# Patient Record
Sex: FEMALE | Race: OTHER
[De-identification: names, ages, dates, MRNs, and addresses within clinical notes are randomized per-mention and may not be internally consistent; named-entity substitution may affect disease eponyms.]

---

## 2020-03-25 ENCOUNTER — HOSPITAL ENCOUNTER (EMERGENCY)
Dept: HOSPITAL 7 - FB.ED | Age: 60
Discharge: HOME | End: 2020-03-25
Payer: COMMERCIAL

## 2020-03-25 VITALS — HEART RATE: 94 BPM | DIASTOLIC BLOOD PRESSURE: 72 MMHG | SYSTOLIC BLOOD PRESSURE: 128 MMHG

## 2020-03-25 DIAGNOSIS — J40: Primary | ICD-10-CM

## 2020-03-25 DIAGNOSIS — Z79.899: ICD-10-CM

## 2020-03-25 DIAGNOSIS — I10: ICD-10-CM

## 2020-03-25 DIAGNOSIS — M19.90: ICD-10-CM

## 2020-03-25 DIAGNOSIS — Z88.8: ICD-10-CM

## 2020-03-25 DIAGNOSIS — J44.9: ICD-10-CM

## 2020-03-25 DIAGNOSIS — E66.9: ICD-10-CM

## 2020-03-25 DIAGNOSIS — E03.9: ICD-10-CM

## 2020-03-25 DIAGNOSIS — Z79.4: ICD-10-CM

## 2020-03-25 DIAGNOSIS — Z88.5: ICD-10-CM

## 2020-03-25 DIAGNOSIS — F41.9: ICD-10-CM

## 2020-03-25 DIAGNOSIS — Z87.891: ICD-10-CM

## 2020-03-25 DIAGNOSIS — E11.9: ICD-10-CM

## 2020-03-25 DIAGNOSIS — F32.9: ICD-10-CM

## 2020-03-25 PROCEDURE — U0002 COVID-19 LAB TEST NON-CDC: HCPCS

## 2020-03-25 NOTE — EDM.PDOC
ED HPI GENERAL MEDICAL PROBLEM





- General


Chief Complaint: Respiratory Problem


Stated Complaint: RESPARATORY PROBLEMS


Time Seen by Provider: 20 09:31


Source of Information: Reports: Patient


History Limitations: Reports: No Limitations





- History of Present Illness


INITIAL COMMENTS - FREE TEXT/NARRATIVE: 





Celina comes to [facility] ED with a 3 day hx of productive cough, some sweats, and 

possible low grade fever. In addition, there is some retrosternal chest pain 

aggravated with coughing, occasional wheezes, and some sore throat. There is 

presumptive exposure to a co worker last week on Thursday and Friday who had 

vacationed in [place]. In addition, Celina's life partner has thyroid CA awaiting 

treatment. She has taken cough meds OTC. 


Treatments PTA: Reports: Other (see below)


Other Treatments PTA: Jerod cough





- Related Data


 Allergies











Allergy/AdvReac Type Severity Reaction Status Date / Time


 


cyclobenzaprine HCl Allergy Mild Irritabilit Verified 20 09:28





[From Flexeril]   y  


 


ibuprofen Allergy Mild Hives Verified 20 09:28


 


morphine Allergy Mild Rash Verified 20 09:28


 


codeine Allergy Unknown Chest Verified 20 09:28





   Tightness  


 


meperidine HCl [From Demerol] Allergy  Rash Verified 20 09:28


 


tramadol Allergy  Other Verified 20 09:28











Home Meds: 


 Home Meds





Aspirin 81 mg PO DAILY 11/14/15 [History]


Hydrochlorothiazide 25 mg PO DAILY 11/14/15 [History]


Insulin Detemir [Levemir Flextouch] 80 unit SQ BEDTIME 11/14/15 [History]


Lisinopril 20 mg DAILY 11/14/15 [History]


Omeprazole 20 mg BID 11/14/15 [History]


atenoloL [Atenolol] 100 mg DAILY 11/14/15 [History]


metFORMIN [Glucophage] 1,000 mg BID 11/14/15 [History]


DULoxetine [Cymbalta] 60 mg PO BEDTIME 20 [History]


Insulin Aspart [NovoLOG] 38 units SQ BID 20 [History]


Loratadine 10 mg PO DAILY 20 [History]











Past Medical History


Cardiovascular History: Reports: Hypertension


Respiratory History: Reports: COPD


Gastrointestinal History: Reports: Diverticulosis


Genitourinary History: Reports: UTI, Recurrent


Other OB/GYN History: hysterectomy, 


Musculoskeletal History: Reports: Arthritis, Neck Pain, Chronic


Psychiatric History: Reports: Addiction, Anxiety, Depression, Psych 

Hospitalization(s), Suicide Attempt


Other Psychiatric History: ETOH addiction


Endocrine/Metabolic History: Reports: Diabetes, Type II, Hypothyroidism, Obesity

/BMI 30+





- Infectious Disease History


Infectious Disease History: Reports: Measles





- Past Surgical History


HEENT Surgical History: Reports: Adenoidectomy, Tonsillectomy


GI Surgical History: Reports: Cholecystectomy, Colonoscopy, EGD


Female  Surgical History: Reports: Hysterectomy, Salpingo-Oophorectomy, Tubal 

Ligation, Other (See Below)


Other Female  Surgeries/Procedures: rectocele





Social & Family History





- Family History


Family Medical History: Noncontributory





- Tobacco Use


Smoking Status *Q: Former Smoker


Years of Tobacco use: 35


Used Tobacco, but Quit: Yes


Month/Year Tobacco Last Used: 





- Caffeine Use


Caffeine Use: Reports: Coffee, Soda





- Recreational Drug Use


Recreational Drug Use: No





ED ROS GENERAL





- Review of Systems


Review Of Systems: See Below


Constitutional: Reports: Fever, Malaise, Night Sweats


HEENT: Reports: Throat Pain


Respiratory: Reports: Wheezing, Cough


Cardiovascular: Reports: Chest Pain


Endocrine: Reports: No Symptoms


GI/Abdominal: Reports: No Symptoms


: Reports: No Symptoms


Musculoskeletal: Reports: No Symptoms


Skin: Reports: No Symptoms


Neurological: Reports: No Symptoms


Psychiatric: Reports: No Symptoms


Hematologic/Lymphatic: Reports: No Symptoms


Immunologic: Reports: No Symptoms





ED EXAM, GENERAL





- Physical Exam


Exam: See Below


Exam Limited By: No Limitations


General Appearance: Alert, WD/WN, No Apparent Distress


Eye Exam: Bilateral Eye: EOMI, Normal Inspection, PERRL


Ears: Normal External Exam


Nose: Normal Inspection


Throat/Mouth: Normal Lips, Normal Teeth, Normal Gums, Normal Oropharynx, No 

Airway Compromise


Head: Normocephalic


Neck: Normal Inspection, Supple, Non-Tender


Respiratory/Chest: No Respiratory Distress, No Accessory Muscle Use, Chest Non-

Tender, Decreased Breath Sounds, Crackles, Rhonchi, Wheezing


Cardiovascular: Regular Rate, Rhythm, No Murmur


GI/Abdominal: Normal Bowel Sounds, Soft, Non-Tender, No Organomegaly, No 

Distention, No Mass


 (Female) Exam: Deferred


Rectal (Female) Exam: Deferred


Back Exam: Normal Inspection


Extremities: Normal Inspection


Neurological: Alert, Oriented, CN II-XII Intact


Psychiatric: Normal Affect, Normal Mood


Skin Exam: Warm, Dry, Intact, Normal Color


Lymphatic: No Adenopathy





Course





- Vital Signs


Text/Narrative:: 





Following assessment in Respiratory Isolation room, screening tests including 

CBC, CMP, RSS, Rapid Inf A&B were negative. The Covid 19 is pending. The chest 

xray was neg. Probable bronchitis, managed sxs at this time. 


Last Recorded V/S: 


 Last Vital Signs











Temp  36.8 C   20 09:21


 


Pulse  96   20 09:21


 


Resp  20   20 09:21


 


BP  132/78   20 09:21


 


Pulse Ox  99   20 09:21














- Orders/Labs/Meds


Orders: 


 Active Orders 24 hr











 Category Date Time Status


 


 Chest 1V Frontal [CR] Stat Exams  20 09:40 Taken


 


 CORONAVIRUS COVID-19, KEL Routine Lab  20 10:00 Received


 


 CULTURE BLOOD [BC] Routine Lab  20 10:10 Received


 


 CULTURE BLOOD [BC] Stat Lab  20 10:05 Received


 


 CULTURE STREP A CONFIRMATION [RM] Stat Lab  20 09:40 Results


 


 STREP SCRN A RAPID W CULT CONF [RM] Stat Lab  20 09:40 Results


 


 Isolation [COMM] Routine Oth  20 09:41 Ordered











Labs: 


 Laboratory Tests











  20 Range/Units





  10:05 10:05 


 


WBC  10.5   (4.5-12.0)  X10-3/uL


 


RBC  5.14   (3.23-5.20)  x10(6)uL


 


Hgb  14.8   (11.5-15.5)  g/dL


 


Hct  45.0   (30.0-51.3)  %


 


MCV  87.6   (80-96)  fL


 


MCH  28.9   (27.7-33.6)  pg


 


MCHC  33.0   (32.2-35.4)  g/dL


 


RDW  12.9   (11.5-15.5)  %


 


Plt Count  242   (125-369)  X10(3)uL


 


MPV  8.3   (7.4-10.4)  fL


 


Neut % (Auto)  66.2   (46-82)  %


 


Lymph % (Auto)  23.4   (13-37)  %


 


Mono % (Auto)  6.5   (4-12)  %


 


Eos % (Auto)  3   (1.0-5.0)  %


 


Baso % (Auto)  1   (0-2)  %


 


Neut # (Auto)  7.0   (1.6-8.3)  #


 


Lymph # (Auto)  2.4   (0.6-5.0)  #


 


Mono # (Auto)  0.7   (0.0-1.3)  #


 


Eos # (Auto)  0.3   (0.0-0.8)  #


 


Baso # (Auto)  0.1   (0.0-0.2)  #


 


Sodium   138  (135-145)  mmol/L


 


Potassium   4.4  (3.5-5.3)  mmol/L


 


Chloride   100  (100-110)  mmol/L


 


Carbon Dioxide   29  (21-32)  mmol/L


 


BUN   18  (7-18)  mg/dL


 


Creatinine   0.9  (0.55-1.02)  mg/dL


 


Est Cr Clr Drug Dosing   52.57  mL/min


 


Estimated GFR (MDRD)   > 60  (>60)  


 


BUN/Creatinine Ratio   20.0  (9-20)  


 


Glucose   188 H  ()  mg/dL


 


Calcium   9.3  (8.6-10.2)  mg/dL














Departure





- Departure


Time of Disposition: 12:11


Disposition: Home, Self-Care 01


Condition: Fair


Clinical Impression: 


 Bronchitis








- Discharge Information


*PRESCRIPTION DRUG MONITORING PROGRAM REVIEWED*: Not Applicable


*COPY OF PRESCRIPTION DRUG MONITORING REPORT IN PATIENT BETHEL: Not Applicable


Forms:  ED Department Discharge





Sepsis Event Note





- Evaluation


Sepsis Screening Result: No Definite Risk





- Focused Exam


Vital Signs: 


 Vital Signs











  Temp Pulse Resp BP Pulse Ox


 


 20 09:21  36.8 C  96  20  132/78  99











Date Exam was Performed: 20


Time Exam was Performed: 12:09





- Problem List & Annotations


(1) Bronchitis


SNOMED Code(s): 96261749


   Code(s): J40 - BRONCHITIS, NOT SPECIFIED AS ACUTE OR CHRONIC   Status: Acute

   Current Visit: Yes   Annotation/Comment:: Suspected viral bronchitis, 

managed sxs. No anti bx dispensed. We will contact her when Covid 19 screen is 

available.    





- Problem List Review


Problem List Initiated/Reviewed/Updated: Yes





- My Orders


Last 24 Hours: 


My Active Orders





20 09:40


Chest 1V Frontal [CR] Stat 


CULTURE STREP A CONFIRMATION [RM] Stat 


STREP SCRN A RAPID W CULT CONF [RM] Stat 





20 09:41


Isolation [COMM] Routine 





20 10:00


CORONAVIRUS COVID-19, KEL Routine 





20 10:05


CULTURE BLOOD [BC] Stat 





20 10:10


CULTURE BLOOD [BC] Routine 














- Assessment/Plan


Last 24 Hours: 


My Active Orders





20 09:40


Chest 1V Frontal [CR] Stat 


CULTURE STREP A CONFIRMATION [RM] Stat 


STREP SCRN A RAPID W CULT CONF [RM] Stat 





20 09:41


Isolation [COMM] Routine 





20 10:00


CORONAVIRUS COVID-19, KEL Routine 





20 10:05


CULTURE BLOOD [BC] Stat 





20 10:10


CULTURE BLOOD [BC] Routine 











Plan: 





Follow up with PCP.

## 2020-03-25 NOTE — CR
INDICATION:  Possible pneumonia.



CHEST:  An AP upright view of the chest 03/25/20 was compared with 09/11/14, 
again revealing evidence of exogenous obesity.  



The heart appears to be at the upper limits of normal in size but is emphasized 
by the AP positioning. 



A definite active infiltrate or effusion was not identified.  



IMPRESSION: 

1.  No definite acute process. 

2.  Possible ASHD.

3.  Exogenous obesity. 
MTDD

## 2020-12-02 ENCOUNTER — HOSPITAL ENCOUNTER (EMERGENCY)
Dept: HOSPITAL 7 - FB.ED | Age: 60
LOS: 1 days | Discharge: NURSING, REHAB OR LONG-TERM CARE | End: 2020-12-03
Payer: COMMERCIAL

## 2020-12-02 VITALS — SYSTOLIC BLOOD PRESSURE: 123 MMHG | DIASTOLIC BLOOD PRESSURE: 67 MMHG | HEART RATE: 120 BPM

## 2020-12-02 DIAGNOSIS — I21.4: Primary | ICD-10-CM

## 2020-12-02 DIAGNOSIS — Z88.5: ICD-10-CM

## 2020-12-02 DIAGNOSIS — E11.9: ICD-10-CM

## 2020-12-02 DIAGNOSIS — Z79.4: ICD-10-CM

## 2020-12-02 DIAGNOSIS — Z79.82: ICD-10-CM

## 2020-12-02 DIAGNOSIS — Z79.899: ICD-10-CM

## 2020-12-02 DIAGNOSIS — Z87.891: ICD-10-CM

## 2020-12-02 DIAGNOSIS — F41.9: ICD-10-CM

## 2020-12-02 DIAGNOSIS — Z88.8: ICD-10-CM

## 2020-12-02 DIAGNOSIS — I10: ICD-10-CM

## 2020-12-02 DIAGNOSIS — M19.90: ICD-10-CM

## 2020-12-02 DIAGNOSIS — F32.9: ICD-10-CM

## 2020-12-02 DIAGNOSIS — E03.9: ICD-10-CM

## 2020-12-02 DIAGNOSIS — J44.9: ICD-10-CM

## 2020-12-02 DIAGNOSIS — Z88.6: ICD-10-CM

## 2020-12-02 DIAGNOSIS — E66.9: ICD-10-CM

## 2020-12-02 PROCEDURE — 85730 THROMBOPLASTIN TIME PARTIAL: CPT

## 2020-12-02 PROCEDURE — 96365 THER/PROPH/DIAG IV INF INIT: CPT

## 2020-12-02 PROCEDURE — 93005 ELECTROCARDIOGRAM TRACING: CPT

## 2020-12-02 PROCEDURE — 85379 FIBRIN DEGRADATION QUANT: CPT

## 2020-12-02 PROCEDURE — 96375 TX/PRO/DX INJ NEW DRUG ADDON: CPT

## 2020-12-02 PROCEDURE — 99285 EMERGENCY DEPT VISIT HI MDM: CPT

## 2020-12-02 PROCEDURE — 83880 ASSAY OF NATRIURETIC PEPTIDE: CPT

## 2020-12-02 PROCEDURE — 85610 PROTHROMBIN TIME: CPT

## 2020-12-02 PROCEDURE — 85025 COMPLETE CBC W/AUTO DIFF WBC: CPT

## 2020-12-02 PROCEDURE — 82962 GLUCOSE BLOOD TEST: CPT

## 2020-12-02 PROCEDURE — 71045 X-RAY EXAM CHEST 1 VIEW: CPT

## 2020-12-02 PROCEDURE — 80053 COMPREHEN METABOLIC PANEL: CPT

## 2020-12-02 PROCEDURE — 84484 ASSAY OF TROPONIN QUANT: CPT

## 2020-12-02 PROCEDURE — 96376 TX/PRO/DX INJ SAME DRUG ADON: CPT

## 2020-12-02 PROCEDURE — 36415 COLL VENOUS BLD VENIPUNCTURE: CPT

## 2020-12-02 RX ADMIN — Medication PRN ML: at 20:51

## 2020-12-02 RX ADMIN — Medication PRN ML: at 20:50

## 2020-12-03 RX ADMIN — Medication PRN ML: at 03:22

## 2020-12-03 NOTE — EDM.PDOC
ED HPI GENERAL MEDICAL PROBLEM





- General


Chief Complaint: Chest Pain


Stated Complaint: CHEST PAIN


Time Seen by Provider: 20 19:00


Source of Information: Reports: Patient


History Limitations: Reports: No Limitations





- History of Present Illness


INITIAL COMMENTS - FREE TEXT/NARRATIVE: 





Patient presented to the ED because of chest pain which started 2 days ago. The 

pain is sharp, 4/10, with associated dyspnea.  Denies having any fever, chills,


cough/cold symptoms or any nausea, vomiting, diarrhea.


  ** midsternal


Pain Score (Numeric/FACES): 1





- Related Data


                                    Allergies











Allergy/AdvReac Type Severity Reaction Status Date / Time


 


cyclobenzaprine HCl Allergy Mild Irritabilit Verified 20 19:08





[From Flexeril]   y  


 


ibuprofen Allergy Mild Hives Verified 20 19:08


 


morphine Allergy Mild Rash Verified 20 19:08


 


codeine Allergy Unknown Chest Verified 20 19:08





   Tightness  


 


meperidine HCl [From Demerol] Allergy  Rash Verified 20 19:08


 


tramadol Allergy  Other Verified 20 19:08











Home Meds: 


                                    Home Meds





Aspirin 81 mg PO DAILY 11/14/15 [History]


Insulin Detemir [Levemir Flextouch] 80 unit SQ BEDTIME 11/14/15 [History]


Lisinopril 30 mg PO DAILY 11/14/15 [History]


metFORMIN [Glucophage] 1,000 mg PO BID 11/14/15 [History]


Insulin Aspart [NovoLOG] 10 units SQ BID PRN 20 [History]


Loratadine 10 mg PO DAILY 20 [History]


Amoxicillin 500 mg PO Q6H 20 [History]


DULoxetine [Cymbalta] 20 mg PO DAILY 20 [History]


DULoxetine [Cymbalta] 60 mg PO DAILY 20 [History]


Fluconazole [Diflucan] 150 mg PO DAILY PRN 20 [History]


Metoprolol Succinate 50 mg PO DAILY 20 [History]


Omeprazole 40 mg PO DAILY 20 [History]


oxyCODONE HCl/Acetaminophen [Oxycodone-Acetaminophen 5-325] 1 each PO Q6H PRN 

20 [History]











Past Medical History


HEENT History: Reports: Other (See Below)


Other HEENT History: 1220 lower bottom teeth extracted/sutures in place, 

placed on Amoxicillin 500mg and oxycodone/apap 5mg prn


Cardiovascular History: Reports: Hypertension


Respiratory History: Reports: COPD


Gastrointestinal History: Reports: Diverticulosis


Genitourinary History: Reports: UTI, Recurrent


Other OB/GYN History: hysterectomy, 


Musculoskeletal History: Reports: Arthritis, Neck Pain, Chronic


Psychiatric History: Reports: Addiction, Anxiety, Depression, Psych 

Hospitalization(s), Suicide Attempt


Other Psychiatric History: ETOH addiction


Endocrine/Metabolic History: Reports: Diabetes, Type II, Hypothyroidism, 

Obesity/BMI 30+





- Infectious Disease History


Infectious Disease History: Reports: Measles





- Past Surgical History


Head Surgeries/Procedures: Reports: None


HEENT Surgical History: Reports: Adenoidectomy, Tonsillectomy


Cardiovascular Surgical History: Reports: None


Respiratory Surgical History: Reports: None


GI Surgical History: Reports: Cholecystectomy, Colonoscopy, EGD


Female  Surgical History: Reports: Hysterectomy, Salpingo-Oophorectomy, Tubal 

Ligation, Other (See Below)


Other Female  Surgeries/Procedures: rectocele


Endocrine Surgical History: Reports: Other (See Below)


Other Endocrine Surgeries/Procedures: blood sugar reader right upper/arm





Social & Family History





- Family History


Family Medical History: No Pertinent Family History





- Tobacco Use


Tobacco Use Status *Q: Former Tobacco User


Used Tobacco, but Quit: Yes


Month/Year Tobacco Last Used: 





- Caffeine Use


Caffeine Use: Reports: Soda


Caffeine Use Comment: 1-2 diet sodas daily





- Recreational Drug Use


Recreational Drug Use: No





ED ROS GENERAL





- Review of Systems


Review Of Systems: See Below


Constitutional: Reports: No Symptoms


HEENT: Reports: No Symptoms


Respiratory: Reports: No Symptoms


Cardiovascular: Reports: Chest Pain


Endocrine: Reports: No Symptoms


GI/Abdominal: Reports: No Symptoms


: Reports: No Symptoms


Musculoskeletal: Reports: No Symptoms


Skin: Reports: No Symptoms


Neurological: Reports: No Symptoms


Psychiatric: Reports: No Symptoms





ED EXAM, GENERAL





- Physical Exam


Exam: See Below


Exam Limited By: No Limitations


General Appearance: Alert, No Apparent Distress


Eye Exam: Bilateral Eye: PERRL


Ears: Normal External Exam


Nose: Normal Inspection, Normal Mucosa


Throat/Mouth: Normal Inspection


Head: Atraumatic, Normocephalic


Neck: Normal Inspection, Supple, Non-Tender, Full Range of Motion


Respiratory/Chest: No Respiratory Distress, Lungs Clear, Normal Breath Sounds


Cardiovascular: Normal Peripheral Pulses, Regular Rate, Rhythm, No Edema


GI/Abdominal: Normal Bowel Sounds, Soft, Non-Tender, No Organomegaly


Back Exam: Normal Inspection, Full Range of Motion


Extremities: Normal Inspection, Normal Range of Motion, Non-Tender





Course





- Vital Signs


Text/Narrative:: 





Labs/EKG/CXR was reviewed with patient


EKG-new LBBB


Trop#1-51


Trop #2-1015


 po x1


NTG 0.4 mg SL


Morphine 2 mg IV x1


Heparin 4000 U IV bolus


Heparin drip @ 1000 U/hr


Case discussed with Dr Mcclain who agreed with the above plan of care.





Last Recorded V/S: 





                                Last Vital Signs











Temp  36.7 C   20 21:51


 


Pulse  120 H  20 21:51


 


Resp  20   20 21:51


 


BP  123/67   20 21:51


 


Pulse Ox  100   20 21:51














- Orders/Labs/Meds


Orders: 





                               Active Orders 24 hr











 Category Date Time Status


 


 Blood Glucose Check, Bedside [RC] ONETIME Care  20 22:46 Active


 


 EKG Documentation Completion [RC] ASDIRECTED Care  20 19:36 Active


 


 EKG Documentation Completion [RC] ASDIRECTED Care  20 02:30 Ordered


 


 Oxygen Therapy Adult [Oxygen Therapy, ED] [RC] Care  20 22:46 Active





 ASDIRECTED   


 


 Chest 1V Frontal [CR] Stat Exams  20 19:36 Taken


 


 Sodium Chloride 0.9% [Saline Flush] Med  20 20:45 Active





 10 ml FLUSH ASDIRECTED PRN   


 


 EKG 12 Lead [EK] Routine Ther  20 19:35 Ordered


 


 EKG 12 Lead [EK] Routine Ther  20 02:26 Ordered








                                Medication Orders





Sodium Chloride (Saline Flush)  10 ml FLUSH ASDIRECTED PRN


   PRN Reason: Keep Vein Open


   Last Admin: 20 20:51  Dose: 10 ml


   Documented by: MICHAEL


   Admin: 20 20:50  Dose: 10 ml


   Documented by: MICHAEL








Labs: 





                                Laboratory Tests











  20 Range/Units





  19:15 19:15 19:15 


 


WBC  11.7 H    (3.0-10.3)  x10-3/uL


 


RBC  4.97    (3.60-5.20)  x10(6)uL


 


Hgb  14.0    (11.4-15.5)  g/dL


 


Hct  43.3    (34.2-48.2)  %


 


MCV  87.1    (76.7-100.5)  fL


 


MCH  28.1    (23.9-33.9)  pg


 


MCHC  32.3    (31.9-34.8)  g/dL


 


RDW  13.7    (12.3-16.5)  %


 


Plt Count  245    (151-488)  x10(3)uL


 


MPV  8.1    (7.1-12.4)  fL


 


Neut % (Auto)  66.9    (30.8-76.2)  %


 


Lymph % (Auto)  26.0    (18.4-52.1)  %


 


Mono % (Auto)  4.7    (4.4-15.7)  %


 


Eos % (Auto)  2.0    (0.6-8.1)  %


 


Baso % (Auto)  0.4    (0.2-1.5)  %


 


Neut # (Auto)  7.8 H    (1.5-6.3)  x10-3/uL


 


Lymph # (Auto)  3.0    (1.0-4.4)  x10-3/uL


 


Mono # (Auto)  0.5    (0.3-1.0)  x10-3/uL


 


Eos # (Auto)  0.2    (0.0-0.8)  x10-3/uL


 


Baso # (Auto)  0.1    (0.0-0.1)  x10-3/uL


 


PT     (9.0-11.1)  sec


 


INR     (1.00-1.24)  


 


APTT     (24.4-33.2)  SECONDS


 


D-Dimer, Quantitative   1.93 H   (0.0-0.59)  mg/LFEU


 


Sodium    136  (135-145)  mmol/L


 


Potassium    4.0  (3.5-5.3)  mmol/L


 


Chloride    99 L  (100-110)  mmol/L


 


Carbon Dioxide    28  (21-32)  mmol/L


 


BUN    15  (7-18)  mg/dL


 


Creatinine    0.9  (0.55-1.02)  mg/dL


 


Est Cr Clr Drug Dosing    TNP  


 


Estimated GFR (MDRD)    > 60  (>60)  


 


BUN/Creatinine Ratio    16.7  (9-20)  


 


Glucose    238 H  ()  mg/dL


 


POC Glucose     ()  mg/dL


 


Calcium    8.9  (8.6-10.2)  mg/dL


 


Total Bilirubin    0.6  (0.1-1.3)  mg/dL


 


AST    29 H  (5-25)  IU/L


 


ALT    28  (12-36)  U/L


 


Alkaline Phosphatase    108  ()  IU/L


 


Troponin I     (4.0-60.3)  pg/mL


 


NT-Pro-B Natriuret Pep     (<=125)  pg/mL


 


Total Protein    8.9 H  (6.0-8.0)  g/dL


 


Albumin    3.4  (3.2-4.6)  g/dL


 


Globulin    5.5  g/dL


 


Albumin/Globulin Ratio    0.6  














  20 Range/Units





  19:15 19:15 19:22 


 


WBC     (3.0-10.3)  x10-3/uL


 


RBC     (3.60-5.20)  x10(6)uL


 


Hgb     (11.4-15.5)  g/dL


 


Hct     (34.2-48.2)  %


 


MCV     (76.7-100.5)  fL


 


MCH     (23.9-33.9)  pg


 


MCHC     (31.9-34.8)  g/dL


 


RDW     (12.3-16.5)  %


 


Plt Count     (151-488)  x10(3)uL


 


MPV     (7.1-12.4)  fL


 


Neut % (Auto)     (30.8-76.2)  %


 


Lymph % (Auto)     (18.4-52.1)  %


 


Mono % (Auto)     (4.4-15.7)  %


 


Eos % (Auto)     (0.6-8.1)  %


 


Baso % (Auto)     (0.2-1.5)  %


 


Neut # (Auto)     (1.5-6.3)  x10-3/uL


 


Lymph # (Auto)     (1.0-4.4)  x10-3/uL


 


Mono # (Auto)     (0.3-1.0)  x10-3/uL


 


Eos # (Auto)     (0.0-0.8)  x10-3/uL


 


Baso # (Auto)     (0.0-0.1)  x10-3/uL


 


PT   10.9   (9.0-11.1)  sec


 


INR   1.01   (1.00-1.24)  


 


APTT   25.9   (24.4-33.2)  SECONDS


 


D-Dimer, Quantitative     (0.0-0.59)  mg/LFEU


 


Sodium     (135-145)  mmol/L


 


Potassium     (3.5-5.3)  mmol/L


 


Chloride     (100-110)  mmol/L


 


Carbon Dioxide     (21-32)  mmol/L


 


BUN     (7-18)  mg/dL


 


Creatinine     (0.55-1.02)  mg/dL


 


Est Cr Clr Drug Dosing     


 


Estimated GFR (MDRD)     (>60)  


 


BUN/Creatinine Ratio     (9-20)  


 


Glucose     ()  mg/dL


 


POC Glucose    189 H  ()  mg/dL


 


Calcium     (8.6-10.2)  mg/dL


 


Total Bilirubin     (0.1-1.3)  mg/dL


 


AST     (5-25)  IU/L


 


ALT     (12-36)  U/L


 


Alkaline Phosphatase     ()  IU/L


 


Troponin I  51.0    (4.0-60.3)  pg/mL


 


NT-Pro-B Natriuret Pep  25    (<=125)  pg/mL


 


Total Protein     (6.0-8.0)  g/dL


 


Albumin     (3.2-4.6)  g/dL


 


Globulin     g/dL


 


Albumin/Globulin Ratio     














  20 Range/Units





  02:00 


 


WBC   (3.0-10.3)  x10-3/uL


 


RBC   (3.60-5.20)  x10(6)uL


 


Hgb   (11.4-15.5)  g/dL


 


Hct   (34.2-48.2)  %


 


MCV   (76.7-100.5)  fL


 


MCH   (23.9-33.9)  pg


 


MCHC   (31.9-34.8)  g/dL


 


RDW   (12.3-16.5)  %


 


Plt Count   (151-488)  x10(3)uL


 


MPV   (7.1-12.4)  fL


 


Neut % (Auto)   (30.8-76.2)  %


 


Lymph % (Auto)   (18.4-52.1)  %


 


Mono % (Auto)   (4.4-15.7)  %


 


Eos % (Auto)   (0.6-8.1)  %


 


Baso % (Auto)   (0.2-1.5)  %


 


Neut # (Auto)   (1.5-6.3)  x10-3/uL


 


Lymph # (Auto)   (1.0-4.4)  x10-3/uL


 


Mono # (Auto)   (0.3-1.0)  x10-3/uL


 


Eos # (Auto)   (0.0-0.8)  x10-3/uL


 


Baso # (Auto)   (0.0-0.1)  x10-3/uL


 


PT   (9.0-11.1)  sec


 


INR   (1.00-1.24)  


 


APTT   (24.4-33.2)  SECONDS


 


D-Dimer, Quantitative   (0.0-0.59)  mg/LFEU


 


Sodium   (135-145)  mmol/L


 


Potassium   (3.5-5.3)  mmol/L


 


Chloride   (100-110)  mmol/L


 


Carbon Dioxide   (21-32)  mmol/L


 


BUN   (7-18)  mg/dL


 


Creatinine   (0.55-1.02)  mg/dL


 


Est Cr Clr Drug Dosing   


 


Estimated GFR (MDRD)   (>60)  


 


BUN/Creatinine Ratio   (9-20)  


 


Glucose   ()  mg/dL


 


POC Glucose   ()  mg/dL


 


Calcium   (8.6-10.2)  mg/dL


 


Total Bilirubin   (0.1-1.3)  mg/dL


 


AST   (5-25)  IU/L


 


ALT   (12-36)  U/L


 


Alkaline Phosphatase   ()  IU/L


 


Troponin I  1014.7 H*  (4.0-60.3)  pg/mL


 


NT-Pro-B Natriuret Pep   (<=125)  pg/mL


 


Total Protein   (6.0-8.0)  g/dL


 


Albumin   (3.2-4.6)  g/dL


 


Globulin   g/dL


 


Albumin/Globulin Ratio   











Meds: 





Medications











Generic Name Dose Route Start Last Admin





  Trade Name Freq  PRN Reason Stop Dose Admin


 


Sodium Chloride  10 ml  20 20:45  20 20:51





  Saline Flush  FLUSH   10 ml





  ASDIRECTED PRN   Administration





  Keep Vein Open  














Discontinued Medications














Generic Name Dose Route Start Last Admin





  Trade Name Freq  PRN Reason Stop Dose Admin


 


Aspirin  324 mg  20 19:37  20 19:15





  Aspirin  PO  20 19:38  324 mg





  NOW STA   Administration


 


Morphine Sulfate  2 mg  20 20:47  20 20:51





  Morphine  IVPUSH  20 20:48  2 mg





  NOW STA   Administration














Departure





- Departure


Time of Disposition: 02:50


Disposition: [facility] to [facility]


Reason for Transfer *Q: Other


Condition: Good


Clinical Impression: 


 NSTEMI (non-ST elevated myocardial infarction)





Referrals: 


PCP,None [Primary Care Provider] - 





Sepsis Event Note (ED)





- Evaluation


Sepsis Screening Result: No Definite Risk





- Focused Exam


Vital Signs: 





                                   Vital Signs











  Temp Pulse Resp BP BP Pulse Ox Pulse Ox


 


 20 21:51  36.7 C  120 H  20  123/67   100 


 


 20 20:27   111 H  17  167/97 H  172/103 H  100 


 


 20 19:15        98


 


 20 18:57  36.6 C  106 H  18   188/100 H  100 














- My Orders


Last 24 Hours: 





My Active Orders





20 19:35


EKG 12 Lead [EK] Routine 





20 19:36


EKG Documentation Completion [RC] ASDIRECTED 


Chest 1V Frontal [CR] Stat 





20 20:45


Sodium Chloride 0.9% [Saline Flush]   10 ml FLUSH ASDIRECTED PRN 





20 22:46


Blood Glucose Check, Bedside [RC] ONETIME 


Oxygen Therapy Adult [Oxygen Therapy, ED] [RC] ASDIRECTED 





20 02:26


EKG 12 Lead [EK] Routine 





20 02:30


EKG Documentation Completion [RC] ASDIRECTED 














- Assessment/Plan


Last 24 Hours: 





My Active Orders





20 19:35


EKG 12 Lead [EK] Routine 





20 19:36


EKG Documentation Completion [RC] ASDIRECTED 


Chest 1V Frontal [CR] Stat 





20 20:45


Sodium Chloride 0.9% [Saline Flush]   10 ml FLUSH ASDIRECTED PRN 





20 22:46


Blood Glucose Check, Bedside [RC] ONETIME 


Oxygen Therapy Adult [Oxygen Therapy, ED] [RC] ASDIRECTED 





20 02:26


EKG 12 Lead [EK] Routine 





20 02:30


EKG Documentation Completion [RC] ASDIRECTED

## 2021-02-22 ENCOUNTER — HOSPITAL ENCOUNTER (EMERGENCY)
Dept: HOSPITAL 7 - FB.ED | Age: 61
Discharge: NURSING, REHAB OR LONG-TERM CARE | End: 2021-02-22
Payer: COMMERCIAL

## 2021-02-22 VITALS — SYSTOLIC BLOOD PRESSURE: 94 MMHG | HEART RATE: 91 BPM | DIASTOLIC BLOOD PRESSURE: 59 MMHG

## 2021-02-22 DIAGNOSIS — I10: ICD-10-CM

## 2021-02-22 DIAGNOSIS — Z88.6: ICD-10-CM

## 2021-02-22 DIAGNOSIS — I25.2: ICD-10-CM

## 2021-02-22 DIAGNOSIS — I20.0: Primary | ICD-10-CM

## 2021-02-22 DIAGNOSIS — Z79.4: ICD-10-CM

## 2021-02-22 DIAGNOSIS — E66.9: ICD-10-CM

## 2021-02-22 DIAGNOSIS — Z88.5: ICD-10-CM

## 2021-02-22 DIAGNOSIS — E11.9: ICD-10-CM

## 2021-02-22 DIAGNOSIS — Z79.82: ICD-10-CM

## 2021-02-22 NOTE — EDM.PDOC
ED HPI GENERAL MEDICAL PROBLEM





- General


Chief Complaint: Cardiovascular Problem


Stated Complaint: CHEST PAIN


Time Seen by Provider: 21 14:01


Source of Information: Reports: Patient


History Limitations: Reports: No Limitations





- History of Present Illness


INITIAL COMMENTS - FREE TEXT/NARRATIVE: 





Presents with non-radiating substernal chest pain, described as an ache, onset 

0600 today. Pain is exacerbated with exertion. Denies SOB. Has had intermittent 

chest pain x 1 week. Patient had a NSTEMI on 20, transferred to [facility], 

underwent LAD stent placement. Patient states today's pain feels similar. PMHx 

includes T2DM, CAD, and HTN. Patient took ASA 81mg PO this morning, and NTG SL x

1 at 12 noon. Chest pain improved after the NTG (from 4/10 to 3/10).


Onset Date: 21


Onset Time: 06:00


Location: Reports: Chest


Quality: Reports: Ache


Severity: Moderate


Worsens with: Reports: Other (Exertion)





- Related Data


                                    Allergies











Allergy/AdvReac Type Severity Reaction Status Date / Time


 


cyclobenzaprine HCl Allergy Mild Irritabilit Verified 20 19:08





[From Flexeril]   y  


 


ibuprofen Allergy Mild Hives Verified 20 19:08


 


morphine Allergy Mild Rash Verified 20 19:08


 


codeine Allergy Unknown Chest Verified 20 19:08





   Tightness  


 


tramadol Allergy  Other Verified 20 19:08











Home Meds: 


                                    Home Meds





Aspirin 81 mg PO DAILY 11/14/15 [History]


Insulin Detemir [Levemir Flextouch] 80 unit SQ BEDTIME 11/14/15 [History]


Lisinopril 30 mg PO DAILY 11/14/15 [History]


metFORMIN [Glucophage] 1,000 mg PO BID 11/14/15 [History]


Insulin Aspart [NovoLOG] 10 units SQ BID PRN 20 [History]


Loratadine 10 mg PO DAILY 20 [History]


Amoxicillin 500 mg PO Q6H 20 [History]


DULoxetine [Cymbalta] 20 mg PO DAILY 20 [History]


DULoxetine [Cymbalta] 60 mg PO DAILY 20 [History]


Fluconazole [Diflucan] 150 mg PO DAILY PRN 20 [History]


Metoprolol Succinate 50 mg PO DAILY 20 [History]


Omeprazole 40 mg PO DAILY 20 [History]


oxyCODONE HCl/Acetaminophen [Oxycodone-Acetaminophen 5-325] 1 each PO Q6H PRN 

20 [History]











Past Medical History


HEENT History: Reports: Other (See Below)


Other HEENT History: 20 lower bottom teeth extracted/sutures in place, 

placed on Amoxicillin 500mg and oxycodone/apap 5mg prn


Cardiovascular History: Reports: Hypertension, MI (2020), PTCA, Other (See 

Below) (LBBB)


Respiratory History: Reports: COPD


Gastrointestinal History: Reports: Diverticulosis


Genitourinary History: Reports: UTI, Recurrent


Other OB/GYN History: hysterectomy, 


Musculoskeletal History: Reports: Arthritis, Neck Pain, Chronic


Psychiatric History: Reports: Addiction, Anxiety, Depression, Psych 

Hospitalization(s), Suicide Attempt


Other Psychiatric History: ETOH addiction


Endocrine/Metabolic History: Reports: Diabetes, Type II, Hypothyroidism, 

Obesity/BMI 30+





- Infectious Disease History


Infectious Disease History: Reports: Measles





- Past Surgical History


Head Surgeries/Procedures: Reports: None


HEENT Surgical History: Reports: Adenoidectomy, Tonsillectomy


Cardiovascular Surgical History: Reports: Coronary Artery Stent


Respiratory Surgical History: Reports: None


GI Surgical History: Reports: Cholecystectomy, Colonoscopy, EGD


Female  Surgical History: Reports: Hysterectomy, Salpingo-Oophorectomy, Tubal 

Ligation, Other (See Below)


Other Female  Surgeries/Procedures: rectocele


Endocrine Surgical History: Reports: Other (See Below)


Other Endocrine Surgeries/Procedures: blood sugar reader right upper/arm





Social & Family History





- Family History


Family Medical History: No Pertinent Family History





- Tobacco Use


Tobacco Use Within Last Twelve Months: No





- Caffeine Use


Caffeine Use: Reports: Soda


Caffeine Use Comment: 1-2 diet sodas daily





ED ROS GENERAL





- Review of Systems


Review Of Systems: Comprehensive ROS is negative, except as noted in HPI.





ED EXAM, GENERAL





- Physical Exam


Exam: See Below


Exam Limited By: No Limitations


General Appearance: Alert, WD/WN, No Apparent Distress


Throat/Mouth: No Airway Compromise


Head: Atraumatic, Normocephalic


Neck: Full Range of Motion


Respiratory/Chest: No Respiratory Distress, Lungs Clear, Normal Breath Sounds, 

Chest Non-Tender


Cardiovascular: Regular Rate, Rhythm, No Gallop, No Murmur


GI/Abdominal: Normal Bowel Sounds, Soft, Non-Tender, No Distention


Back Exam: Full Range of Motion


Extremities: Normal Range of Motion, Non-Tender


Neurological: Alert, Normal Cognition


Psychiatric: Normal Affect, Normal Mood


Skin Exam: Warm, Dry, Intact


  ** #1 Interpretation


EKG Date: 21


Time: 13:48


Rhythm: NSR


Rate (Beats/Min): 98


Axis: Normal


P-Wave: Present


QRS: Other (incomplete LBBB)


ST-T: Normal


QT: Normal


Comparison: No Change (2020)





Course





- Vital Signs


Last Recorded V/S: 


                                Last Vital Signs











Temp  37.0 C   21 13:51


 


Pulse  96   21 13:51


 


Resp  18   21 13:51


 


BP  111/57 L  21 14:22


 


Pulse Ox  98   21 13:51














- Orders/Labs/Meds


Orders: 


                               Active Orders 24 hr











 Category Date Time Status


 


 EKG Documentation Completion [RC] ASDIRECTED Care  21 13:52 Active


 


 CXR [Chest 1V Frontal] [CR] Stat Exams  21 13:52 Taken


 


 Heparin 25,000 Units @ 20MLS/HR Med  21 14:45 Ordered





 Heparin Sodium/0.45% NaCl [Heparin 25,000 Units in 1/2   





  ML] 500 ml   





 IV ASDIRECTED   


 


 Nitroglycerin [Nitrostat] Med  21 13:59 Active





 0.4 mg SL Q5M PRN   


 


 Sodium Chloride 0.9% [Normal Saline] 500 ml Med  21 14:33 Active





 IV .BOLUS   


 


 Sodium Chloride 0.9% [Saline Flush] Med  21 13:52 Active





 10 ml FLUSH ASDIRECTED PRN   


 


 Saline Lock Insert [OM.PC] Routine Oth  21 13:52 Ordered


 


 EKG 12 Lead [EK] Stat Ther  21 13:52 Ordered








                                Medication Orders





Sodium Chloride (Normal Saline)  500 mls @ 500 mls/hr IV .BOLUS ONE


   Stop: 21 15:32


   Last Admin: 21 14:40  Dose: 500 mls/hr


   Documented by: JYOTHI


Heparin Sodium/Sodium Chloride (Heparin 25,000 Units In 1/2 Ns 500 Ml)  500 mls 

@ 20 mls/hr IV ASDIRECTED AKANKSHA


Nitroglycerin (Nitrostat)  0.4 mg SL Q5M PRN


   PRN Reason: Chest Pain


   Last Admin: 21 14:22  Dose: 0.4 mg


   Documented by: JYOTHI


Sodium Chloride (Saline Flush)  10 ml FLUSH ASDIRECTED PRN


   PRN Reason: Keep Vein Open








Labs: 


                                Laboratory Tests











  21 Range/Units





  14:09 14:09 14:09 


 


WBC  9.4    (3.0-10.3)  x10-3/uL


 


RBC  4.12    (3.60-5.20)  x10(6)uL


 


Hgb  12.0    (11.4-15.5)  g/dL


 


Hct  35.0    (34.2-48.2)  %


 


MCV  85.1    (76.7-100.5)  fL


 


MCH  29.0    (23.9-33.9)  pg


 


MCHC  34.1    (31.9-34.8)  g/dL


 


RDW  14.0    (12.3-16.5)  %


 


Plt Count  204    (151-488)  x10(3)uL


 


MPV  7.5    (7.1-12.4)  fL


 


Neut % (Auto)  54.8    (30.8-76.2)  %


 


Lymph % (Auto)  34.3    (18.4-52.1)  %


 


Mono % (Auto)  6.5    (4.4-15.7)  %


 


Eos % (Auto)  2.8    (0.6-8.1)  %


 


Baso % (Auto)  1.6 H    (0.2-1.5)  %


 


Neut # (Auto)  5.2    (1.5-6.3)  x10-3/uL


 


Lymph # (Auto)  3.2    (1.0-4.4)  x10-3/uL


 


Mono # (Auto)  0.6    (0.3-1.0)  x10-3/uL


 


Eos # (Auto)  0.3    (0.0-0.8)  x10-3/uL


 


Baso # (Auto)  0.1    (0.0-0.1)  x10-3/uL


 


PT   10.9   (9.0-11.1)  sec


 


INR   1.01   (1.00-1.24)  


 


APTT   24.5   (24.4-33.2)  SECONDS


 


Sodium    132 L  (135-145)  mmol/L


 


Potassium    4.1  (3.5-5.3)  mmol/L


 


Chloride    97 L  (100-110)  mmol/L


 


Carbon Dioxide    26  (21-32)  mmol/L


 


BUN    21 H  (7-18)  mg/dL


 


Creatinine    1.2 H  (0.55-1.02)  mg/dL


 


Est Cr Clr Drug Dosing    TNP  


 


Estimated GFR (MDRD)    46 L  (>60)  


 


BUN/Creatinine Ratio    17.5  (9-20)  


 


Glucose    216 H  ()  mg/dL


 


Calcium    8.4 L  (8.6-10.2)  mg/dL


 


Total Bilirubin    0.4  (0.1-1.3)  mg/dL


 


AST    30 H  (5-25)  IU/L


 


ALT    30  (12-36)  U/L


 


Alkaline Phosphatase    124 H  ()  IU/L


 


Troponin I     (4.0-60.3)  pg/mL


 


Total Protein    8.7 H  (6.0-8.0)  g/dL


 


Albumin    3.5  (3.2-4.6)  g/dL


 


Globulin    5.2  g/dL


 


Albumin/Globulin Ratio    0.7  














  // Range/Units





  14:09 


 


WBC   (3.0-10.3)  x10-3/uL


 


RBC   (3.60-5.20)  x10(6)uL


 


Hgb   (11.4-15.5)  g/dL


 


Hct   (34.2-48.2)  %


 


MCV   (76.7-100.5)  fL


 


MCH   (23.9-33.9)  pg


 


MCHC   (31.9-34.8)  g/dL


 


RDW   (12.3-16.5)  %


 


Plt Count   (151-488)  x10(3)uL


 


MPV   (7.1-12.4)  fL


 


Neut % (Auto)   (30.8-76.2)  %


 


Lymph % (Auto)   (18.4-52.1)  %


 


Mono % (Auto)   (4.4-15.7)  %


 


Eos % (Auto)   (0.6-8.1)  %


 


Baso % (Auto)   (0.2-1.5)  %


 


Neut # (Auto)   (1.5-6.3)  x10-3/uL


 


Lymph # (Auto)   (1.0-4.4)  x10-3/uL


 


Mono # (Auto)   (0.3-1.0)  x10-3/uL


 


Eos # (Auto)   (0.0-0.8)  x10-3/uL


 


Baso # (Auto)   (0.0-0.1)  x10-3/uL


 


PT   (9.0-11.1)  sec


 


INR   (1.00-1.24)  


 


APTT   (24.4-33.2)  SECONDS


 


Sodium   (135-145)  mmol/L


 


Potassium   (3.5-5.3)  mmol/L


 


Chloride   (100-110)  mmol/L


 


Carbon Dioxide   (21-32)  mmol/L


 


BUN   (7-18)  mg/dL


 


Creatinine   (0.55-1.02)  mg/dL


 


Est Cr Clr Drug Dosing   


 


Estimated GFR (MDRD)   (>60)  


 


BUN/Creatinine Ratio   (9-20)  


 


Glucose   ()  mg/dL


 


Calcium   (8.6-10.2)  mg/dL


 


Total Bilirubin   (0.1-1.3)  mg/dL


 


AST   (5-25)  IU/L


 


ALT   (12-36)  U/L


 


Alkaline Phosphatase   ()  IU/L


 


Troponin I  < 4.0 L  (4.0-60.3)  pg/mL


 


Total Protein   (6.0-8.0)  g/dL


 


Albumin   (3.2-4.6)  g/dL


 


Globulin   g/dL


 


Albumin/Globulin Ratio   











Meds: 


Medications











Generic Name Dose Route Start Last Admin





  Trade Name Joviq  PRN Reason Stop Dose Admin


 


Sodium Chloride  500 mls @ 500 mls/hr  21 14:33  21 14:40





  Normal Saline  IV  21 15:32  500 mls/hr





  .BOLUS ONE   Administration


 


Heparin Sodium/Sodium Chloride  500 mls @ 20 mls/hr  21 14:45 





  Heparin 25,000 Units In 1/2 Ns 500 Ml  IV  





  ASDIRECTED AKANKSHA  


 


Nitroglycerin  0.4 mg  21 13:59  21 14:22





  Nitrostat  SL   0.4 mg





  Q5M PRN   Administration





  Chest Pain  


 


Sodium Chloride  10 ml  21 13:52 





  Saline Flush  FLUSH  





  ASDIRECTED PRN  





  Keep Vein Open  














Discontinued Medications














Generic Name Dose Route Start Last Admin





  Trade Name Chirag  PRN Reason Stop Dose Admin


 


Aspirin  324 mg  21 13:54  21 14:16





  Aspirin  PO  21 13:55  Not Given





  ONETIME ONE  


 


Aspirin  243 mg  21 13:59  21 14:10





  Aspirin  PO  21 14:00  243 mg





  ONETIME ONE   Administration


 


Heparin Sodium (Porcine)  4,000 units  21 14:40 





  Heparin Sodium  IVPUSH  21 14:41 





  ONETIME ONE  














- Radiology Interpretation


Free Text/Narrative:: 





CXR: No acute process. (ED provider interpretation)





- Re-Assessments/Exams


Free Text/Narrative Re-Assessment/Exam: 





21 14:30


Chest pain improved from 310 to 110 after NTG SL x 1, however SBP dropped to 

100. Will give NS 500ml bolus IV.


21 14:42


Dr. Mcclain ([facility] ED MD) accepts patient for transfer, recommends 

Heparin bolus and drip. Will transport by ALS ground.








Departure





- Departure


Time of Disposition: 14:43


Disposition: DC/Tfer to Acute Hospital 02


Reason for Transfer *Q: Primary PCI Indicated


Condition: Fair


Clinical Impression: 


 Unstable angina





Forms:  ED Department Discharge





Sepsis Event Note (ED)





- Focused Exam


Vital Signs: 


                                   Vital Signs











  Temp Pulse Resp BP BP Pulse Ox


 


 21 14:22     111/57 L  


 


 21 13:51  37.0 C  96  18   123/72  98














- My Orders


Last 24 Hours: 


My Active Orders





21 13:52


EKG Documentation Completion [RC] ASDIRECTED 


CXR [Chest 1V Frontal] [CR] Stat 


Sodium Chloride 0.9% [Saline Flush]   10 ml FLUSH ASDIRECTED PRN 


Saline Lock Insert [OM.PC] Routine 


EKG 12 Lead [EK] Stat 





21 13:59


Nitroglycerin [Nitrostat]   0.4 mg SL Q5M PRN 





21 14:33


Sodium Chloride 0.9% [Normal Saline] 500 ml IV .BOLUS 





21 14:45


Heparin 25,000 Units @ 20MLS/HR Heparin Sodium/0.45% NaCl [Heparin 25,000 Units 

in 1/2  ML] 500 ml IV ASDIRECTED 














- Assessment/Plan


Last 24 Hours: 


My Active Orders





21 13:52


EKG Documentation Completion [RC] ASDIRECTED 


CXR [Chest 1V Frontal] [CR] Stat 


Sodium Chloride 0.9% [Saline Flush]   10 ml FLUSH ASDIRECTED PRN 


Saline Lock Insert [OM.PC] Routine 


EKG 12 Lead [EK] Stat 





21 13:59


Nitroglycerin [Nitrostat]   0.4 mg SL Q5M PRN 





21 14:33


Sodium Chloride 0.9% [Normal Saline] 500 ml IV .BOLUS 





21 14:45


Heparin 25,000 Units @ 20MLS/HR Heparin Sodium/0.45% NaCl [Heparin 25,000 Units 

in 1/2  ML] 500 ml IV ASDIRECTED

## 2021-02-22 NOTE — CR
INDICATION:  Chest pain.  



CHEST ONE VIEW:  Portable AP upright view of the chest 02/22/21 was compared 
with 12/02/20 and 03/25/20.  



The heart did not appear enlarged allowing for the AP positioning and what 
appears to be a relatively poor inspiration.  

Overlying EKG leads are noted. 

Degenerative changes noted in the spine.  

Evidence of exogenous obesity is noted.  



An active infiltrate or effusion was not identified.  



IMPRESSION:  No acute process - stable appearing chest x-ray.  

MTDD

## 2021-04-12 ENCOUNTER — HOSPITAL ENCOUNTER (EMERGENCY)
Dept: HOSPITAL 7 - FB.ED | Age: 61
Discharge: HOME | End: 2021-04-12
Payer: COMMERCIAL

## 2021-04-12 VITALS — DIASTOLIC BLOOD PRESSURE: 95 MMHG | HEART RATE: 120 BPM | SYSTOLIC BLOOD PRESSURE: 138 MMHG

## 2021-04-12 DIAGNOSIS — Z79.899: ICD-10-CM

## 2021-04-12 DIAGNOSIS — Z79.4: ICD-10-CM

## 2021-04-12 DIAGNOSIS — Z88.8: ICD-10-CM

## 2021-04-12 DIAGNOSIS — M19.90: ICD-10-CM

## 2021-04-12 DIAGNOSIS — I44.7: ICD-10-CM

## 2021-04-12 DIAGNOSIS — E03.9: ICD-10-CM

## 2021-04-12 DIAGNOSIS — E11.9: ICD-10-CM

## 2021-04-12 DIAGNOSIS — I25.2: ICD-10-CM

## 2021-04-12 DIAGNOSIS — Z79.82: ICD-10-CM

## 2021-04-12 DIAGNOSIS — I10: ICD-10-CM

## 2021-04-12 DIAGNOSIS — Z88.5: ICD-10-CM

## 2021-04-12 DIAGNOSIS — Z95.5: ICD-10-CM

## 2021-04-12 DIAGNOSIS — Z88.6: ICD-10-CM

## 2021-04-12 DIAGNOSIS — Z79.02: ICD-10-CM

## 2021-04-12 DIAGNOSIS — E66.9: ICD-10-CM

## 2021-04-12 DIAGNOSIS — J44.9: ICD-10-CM

## 2021-04-12 DIAGNOSIS — I25.119: Primary | ICD-10-CM

## 2021-04-12 PROCEDURE — 85610 PROTHROMBIN TIME: CPT

## 2021-04-12 PROCEDURE — 36415 COLL VENOUS BLD VENIPUNCTURE: CPT

## 2021-04-12 PROCEDURE — 85730 THROMBOPLASTIN TIME PARTIAL: CPT

## 2021-04-12 PROCEDURE — 93005 ELECTROCARDIOGRAM TRACING: CPT

## 2021-04-12 PROCEDURE — 84484 ASSAY OF TROPONIN QUANT: CPT

## 2021-04-12 PROCEDURE — 85025 COMPLETE CBC W/AUTO DIFF WBC: CPT

## 2021-04-12 PROCEDURE — 80053 COMPREHEN METABOLIC PANEL: CPT

## 2021-04-12 PROCEDURE — 83880 ASSAY OF NATRIURETIC PEPTIDE: CPT

## 2021-04-12 PROCEDURE — 99285 EMERGENCY DEPT VISIT HI MDM: CPT

## 2021-04-12 NOTE — EDM.PDOC
ED HPI GENERAL MEDICAL PROBLEM





- General


Stated Complaint: CHEST PAIN


Time Seen by Provider: 21 08:00


Source of Information: Reports: Patient


History Limitations: Reports: No Limitations





- History of Present Illness


INITIAL COMMENTS - FREE TEXT/NARRATIVE: 





Patient presented to the ED because of chest pain at about 3 AM. She apparently 

had a nightmare and was upset. the pain is  sharp over the sternal area,4/10. 

there is no N/V, dyspnea or diaphoresis. She just want to be checked because she

has a CAD with stent placement.





- Related Data


                                    Allergies











Allergy/AdvReac Type Severity Reaction Status Date / Time


 


cyclobenzaprine HCl Allergy Mild Irritabilit Verified 21 08:13





[From Flexeril]   y  


 


ibuprofen Allergy Mild Hives Verified 21 08:13


 


morphine Allergy Mild Rash Verified 21 08:13


 


codeine Allergy Unknown Chest Verified 21 08:13





   Tightness  


 


tramadol Allergy  Other Verified 21 08:13











Home Meds: 


                                    Home Meds





Aspirin 81 mg PO DAILY 11/14/15 [History]


Insulin Detemir [Levemir Flextouch] 80 unit SQ BEDTIME 11/14/15 [History]


Lisinopril 20 mg PO DAILY 11/14/15 [History]


Insulin Aspart [NovoLOG] 10 units SQ BID PRN 20 [History]


Loratadine 10 mg PO DAILY 20 [History]


Metoprolol Succinate 100 mg PO DAILY 20 [History]


Clopidogrel [Plavix] 75 mg PO DAILY 21 [History]


Fluconazole 150 mg PO DAILY PRN 21 [History]


Pantoprazole [ProTONIX***] 40 mg PO DAILY 21 [History]











Past Medical History


HEENT History: Reports: Other (See Below)


Other HEENT History: 20 lower bottom teeth extracted/sutures in place, 

placed on Amoxicillin 500mg and oxycodone/apap 5mg prn


Cardiovascular History: Reports: Hypertension, MI (2020), PTCA, Other (See 

Below) (LBBB)


Respiratory History: Reports: COPD


Gastrointestinal History: Reports: Diverticulosis


Genitourinary History: Reports: UTI, Recurrent


Other OB/GYN History: hysterectomy, 


Musculoskeletal History: Reports: Arthritis, Neck Pain, Chronic


Psychiatric History: Reports: Addiction, Anxiety, Depression, Psych 

Hospitalization(s), Suicide Attempt


Other Psychiatric History: ETOH addiction


Endocrine/Metabolic History: Reports: Diabetes, Type II, Hypothyroidism, 

Obesity/BMI 30+





- Infectious Disease History


Infectious Disease History: Reports: Measles





- Past Surgical History


Head Surgeries/Procedures: Reports: None


HEENT Surgical History: Reports: Adenoidectomy, Tonsillectomy


Cardiovascular Surgical History: Reports: Coronary Artery Stent


Respiratory Surgical History: Reports: None


GI Surgical History: Reports: Cholecystectomy, Colonoscopy, EGD


Female  Surgical History: Reports: Hysterectomy, Salpingo-Oophorectomy, Tubal 

Ligation, Other (See Below)


Other Female  Surgeries/Procedures: rectocele


Endocrine Surgical History: Reports: Other (See Below)


Other Endocrine Surgeries/Procedures: blood sugar reader right upper/arm





Social & Family History





- Family History


Family Medical History: No Pertinent Family History





- Caffeine Use


Caffeine Use: Reports: None


Caffeine Use Comment: 1-2 diet sodas daily





ED ROS GENERAL





- Review of Systems


Review Of Systems: See Below


Constitutional: Reports: No Symptoms


HEENT: Reports: No Symptoms


Respiratory: Reports: No Symptoms


Cardiovascular: Reports: Chest Pain


Endocrine: Reports: No Symptoms


GI/Abdominal: Reports: No Symptoms


: Reports: No Symptoms


Musculoskeletal: Reports: No Symptoms


Skin: Reports: No Symptoms


Neurological: Reports: No Symptoms





ED EXAM, GENERAL





- Physical Exam


Exam: See Below


Exam Limited By: No Limitations


General Appearance: Alert, No Apparent Distress


Eye Exam: Bilateral Eye: PERRL


Ears: Normal External Exam, Normal Canal


Nose: Normal Inspection, Normal Mucosa, No Blood


Throat/Mouth: Normal Inspection, Normal Lips, Normal Teeth


Head: Atraumatic, Normocephalic


Neck: Normal Inspection, Supple, Non-Tender, Full Range of Motion


Respiratory/Chest: No Respiratory Distress, Lungs Clear, Normal Breath Sounds


Cardiovascular: Normal Peripheral Pulses, Regular Rate, Rhythm, No Edema, No 

Gallop, No JVD, No Murmur, No Rub


GI/Abdominal: Normal Bowel Sounds, Soft, Non-Tender, No Organomegaly, No 

Distention, No Abnormal Bruit


Back Exam: Normal Inspection, Full Range of Motion


Extremities: Normal Inspection, Normal Range of Motion, Non-Tender, No Pedal 

Edema, Normal Capillary Refill


Neurological: Alert, Oriented, CN II-XII Intact, Normal Cognition, Normal Gait, 

Normal Reflexes, No Motor/Sensory Deficits


Psychiatric: Normal Affect, Normal Mood


  ** #1 Interpretation


EKG Date: 21


Time: 07:58


Rhythm: Other (Sinus Tach)


Rate (Beats/Min): 119


Axis: Normal


P-Wave: Present


QRS: LBBB


ST-T: Normal


QT: Normal


Comparison: No Change (Sinus Tach LBBB)


EKG Interpretation Comments: 





NSR


No acute changes





Course





- Vital Signs


Text/Narrative:: 





Lab/EKG result was reviewed and discussed with patient


 mg po x1-she took 81 mg at home


NTG -4.4 mg SL x1


Last Recorded V/S: 


                                Last Vital Signs











Temp  36.6 C   21 08:00


 


Pulse  120 H  21 08:00


 


Resp  18   21 08:00


 


BP  146/82 H  21 08:38


 


Pulse Ox  99   21 08:00














- Orders/Labs/Meds


Orders: 


                               Active Orders 24 hr











 Category Date Time Status


 


 Saline Lock Insert [OM.PC] Routine Oth  21 08:16 Ordered


 


 EKG 12 Lead [EK] Routine Ther  21 08:16 Ordered











Labs: 


                                Laboratory Tests











  21 Range/Units





  08:36 08:36 08:36 


 


WBC  8.6    (3.0-10.3)  x10-3/uL


 


RBC  4.48    (3.60-5.20)  x10(6)uL


 


Hgb  13.2    (11.4-15.5)  g/dL


 


Hct  39.5    (34.2-48.2)  %


 


MCV  88.1    (76.7-100.5)  fL


 


MCH  29.4    (23.9-33.9)  pg


 


MCHC  33.4    (31.9-34.8)  g/dL


 


RDW  13.5    (12.3-16.5)  %


 


Plt Count  222    (151-488)  x10(3)uL


 


MPV  7.8    (7.1-12.4)  fL


 


Neut % (Auto)  67.3    (30.8-76.2)  %


 


Lymph % (Auto)  24.4    (18.4-52.1)  %


 


Mono % (Auto)  4.9    (4.4-15.7)  %


 


Eos % (Auto)  2.9    (0.6-8.1)  %


 


Baso % (Auto)  0.5    (0.2-1.5)  %


 


Neut # (Auto)  5.8    (1.5-6.3)  x10-3/uL


 


Lymph # (Auto)  2.1    (1.0-4.4)  x10-3/uL


 


Mono # (Auto)  0.4    (0.3-1.0)  x10-3/uL


 


Eos # (Auto)  0.2    (0.0-0.8)  x10-3/uL


 


Baso # (Auto)  0.0    (0.0-0.1)  x10-3/uL


 


PT   11.0   (9.0-11.1)  sec


 


INR   1.02   (1.00-1.24)  


 


APTT   25.1   (24.4-33.2)  SECONDS


 


Sodium    136  (135-145)  mmol/L


 


Potassium    3.9  (3.5-5.3)  mmol/L


 


Chloride    98 L  (100-110)  mmol/L


 


Carbon Dioxide    24  (21-32)  mmol/L


 


BUN    17  (7-18)  mg/dL


 


Creatinine    1.1 H  (0.55-1.02)  mg/dL


 


Est Cr Clr Drug Dosing    TNP  


 


Estimated GFR (MDRD)    50 L  (>60)  


 


BUN/Creatinine Ratio    15.5  (9-20)  


 


Glucose    306 H D  ()  mg/dL


 


Calcium    8.4 L  (8.6-10.2)  mg/dL


 


Total Bilirubin    0.4  (0.1-1.3)  mg/dL


 


AST    38 H D  (5-25)  IU/L


 


ALT    34  D  (12-36)  U/L


 


Alkaline Phosphatase    132 H  ()  IU/L


 


Troponin I     (4.0-60.3)  pg/mL


 


NT-Pro-B Natriuret Pep     (<=125)  pg/mL


 


Total Protein    8.6 H  (6.0-8.0)  g/dL


 


Albumin    3.2  (3.2-4.6)  g/dL


 


Globulin    5.4  g/dL


 


Albumin/Globulin Ratio    0.6  














  21 Range/Units





  08:36 


 


WBC   (3.0-10.3)  x10-3/uL


 


RBC   (3.60-5.20)  x10(6)uL


 


Hgb   (11.4-15.5)  g/dL


 


Hct   (34.2-48.2)  %


 


MCV   (76.7-100.5)  fL


 


MCH   (23.9-33.9)  pg


 


MCHC   (31.9-34.8)  g/dL


 


RDW   (12.3-16.5)  %


 


Plt Count   (151-488)  x10(3)uL


 


MPV   (7.1-12.4)  fL


 


Neut % (Auto)   (30.8-76.2)  %


 


Lymph % (Auto)   (18.4-52.1)  %


 


Mono % (Auto)   (4.4-15.7)  %


 


Eos % (Auto)   (0.6-8.1)  %


 


Baso % (Auto)   (0.2-1.5)  %


 


Neut # (Auto)   (1.5-6.3)  x10-3/uL


 


Lymph # (Auto)   (1.0-4.4)  x10-3/uL


 


Mono # (Auto)   (0.3-1.0)  x10-3/uL


 


Eos # (Auto)   (0.0-0.8)  x10-3/uL


 


Baso # (Auto)   (0.0-0.1)  x10-3/uL


 


PT   (9.0-11.1)  sec


 


INR   (1.00-1.24)  


 


APTT   (24.4-33.2)  SECONDS


 


Sodium   (135-145)  mmol/L


 


Potassium   (3.5-5.3)  mmol/L


 


Chloride   (100-110)  mmol/L


 


Carbon Dioxide   (21-32)  mmol/L


 


BUN   (7-18)  mg/dL


 


Creatinine   (0.55-1.02)  mg/dL


 


Est Cr Clr Drug Dosing   


 


Estimated GFR (MDRD)   (>60)  


 


BUN/Creatinine Ratio   (9-20)  


 


Glucose   ()  mg/dL


 


Calcium   (8.6-10.2)  mg/dL


 


Total Bilirubin   (0.1-1.3)  mg/dL


 


AST   (5-25)  IU/L


 


ALT   (12-36)  U/L


 


Alkaline Phosphatase   ()  IU/L


 


Troponin I  < 4.0 L  (4.0-60.3)  pg/mL


 


NT-Pro-B Natriuret Pep  53  (<=125)  pg/mL


 


Total Protein   (6.0-8.0)  g/dL


 


Albumin   (3.2-4.6)  g/dL


 


Globulin   g/dL


 


Albumin/Globulin Ratio   











Meds: 


Medications














Discontinued Medications














Generic Name Dose Route Start Last Admin





  Trade Name Chirag  PRN Reason Stop Dose Admin


 


Aspirin  243 mg  21 08:17  21 08:36





  Aspirin 81 Mg Tab.Chew  PO  21 08:18  243 mg





  ONETIME ONE   Administration


 


Nitroglycerin  0.4 mg  21 08:18  21 08:38





  Nitroglycerin 0.4 Mg Tab.Sl  SL  21 08:19  0.4 mg





  NOW STA   Administration


 


Sodium Chloride  10 ml  21 08:16  21 08:30





  Sodium Chloride 0.9% 10 Ml Syringe  FLUSH   10 ml





  ASDIRECTED PRN   Administration





  Keep Vein Open  














Departure





- Departure


Time of Disposition: 10:15


Disposition: Home, Self-Care 01


Condition: Good


Clinical Impression: 


 Chest pain, Angina at rest





Instructions:  Angina, Easy-to-Read


Referrals: 


Chance Ochoa PA [Primary Care Provider] - 


Forms:  ED Department Discharge


Additional Instructions: 


Please read discharge instructions on chest pain


Continue your medications


Follow up with your cardiologis(heart specialist) your chest pain is becoming 

more frequent and more intense 





- My Orders


Last 24 Hours: 


My Active Orders





21 08:16


Saline Lock Insert [OM.PC] Routine 


EKG 12 Lead [EK] Routine 














- Assessment/Plan


Last 24 Hours: 


My Active Orders





21 08:16


Saline Lock Insert [OM.PC] Routine 


EKG 12 Lead [EK] Routine

## 2021-05-06 ENCOUNTER — HOSPITAL ENCOUNTER (EMERGENCY)
Dept: HOSPITAL 7 - FB.ED | Age: 61
Discharge: HOME | End: 2021-05-06
Payer: COMMERCIAL

## 2021-05-06 VITALS — HEART RATE: 91 BPM | SYSTOLIC BLOOD PRESSURE: 148 MMHG | DIASTOLIC BLOOD PRESSURE: 80 MMHG

## 2021-05-06 DIAGNOSIS — I25.2: ICD-10-CM

## 2021-05-06 DIAGNOSIS — Z79.899: ICD-10-CM

## 2021-05-06 DIAGNOSIS — Z79.02: ICD-10-CM

## 2021-05-06 DIAGNOSIS — Z88.5: ICD-10-CM

## 2021-05-06 DIAGNOSIS — Z79.82: ICD-10-CM

## 2021-05-06 DIAGNOSIS — K21.9: Primary | ICD-10-CM

## 2021-05-06 DIAGNOSIS — I10: ICD-10-CM

## 2021-05-06 DIAGNOSIS — E66.9: ICD-10-CM

## 2021-05-06 DIAGNOSIS — Z88.8: ICD-10-CM

## 2021-05-06 DIAGNOSIS — E11.9: ICD-10-CM

## 2021-05-06 DIAGNOSIS — Z88.6: ICD-10-CM

## 2021-05-06 DIAGNOSIS — E03.9: ICD-10-CM

## 2021-05-06 PROCEDURE — 86140 C-REACTIVE PROTEIN: CPT

## 2021-05-06 PROCEDURE — 84484 ASSAY OF TROPONIN QUANT: CPT

## 2021-05-06 PROCEDURE — 36415 COLL VENOUS BLD VENIPUNCTURE: CPT

## 2021-05-06 PROCEDURE — 85025 COMPLETE CBC W/AUTO DIFF WBC: CPT

## 2021-05-06 PROCEDURE — 93005 ELECTROCARDIOGRAM TRACING: CPT

## 2021-05-06 PROCEDURE — 99285 EMERGENCY DEPT VISIT HI MDM: CPT

## 2021-05-06 PROCEDURE — 80053 COMPREHEN METABOLIC PANEL: CPT

## 2021-05-06 PROCEDURE — 81001 URINALYSIS AUTO W/SCOPE: CPT

## 2021-05-06 NOTE — EDM.PDOC
ED HPI GENERAL MEDICAL PROBLEM





- General


Stated Complaint: CHEST PAIN


Time Seen by Provider: 21 16:40


Source of Information: Reports: Patient, Family


History Limitations: Reports: No Limitations





- History of Present Illness


INITIAL COMMENTS - FREE TEXT/NARRATIVE: 





c/o chest fluttering x 12h





nonspecific chest fluttering, no tachycardia, vague minor chest discomfort, 

mainly in epigastric area, eating okay





feels better lying on L side





took APAP x 2 at noon without benefit





no NTG or other meds





had first stent 20, did okay for 3 months with CP off and on, then had 

daily vague CP x 1m in April and "not feeling well"





2nd stent placed 2d ago, pt did not know if the cath 2d ago was different from 

4m ago





no change in meds with new stent at [facility]





h/o GERD, on PPI, took no AA today





EKG ow with LBBB and no ST changes, no change c/w 21, SR 89





her with sig other











- Related Data


                                    Allergies











Allergy/AdvReac Type Severity Reaction Status Date / Time


 


cyclobenzaprine HCl Allergy Mild Irritabilit Verified 21 08:13





[From Flexeril]   y  


 


ibuprofen Allergy Mild Hives Verified 21 08:13


 


morphine Allergy Mild Rash Verified 21 08:13


 


codeine Allergy Unknown Chest Verified 21 08:13





   Tightness  


 


tramadol Allergy  Other Verified 21 08:13











Home Meds: 


                                    Home Meds





Aspirin 81 mg PO DAILY 11/14/15 [History]


Insulin Detemir [Levemir Flextouch] 80 unit SQ BEDTIME 11/14/15 [History]


Lisinopril 20 mg PO DAILY 11/14/15 [History]


Insulin Aspart [NovoLOG] 10 units SQ BID PRN 20 [History]


Loratadine 10 mg PO DAILY 20 [History]


Metoprolol Succinate 100 mg PO DAILY 20 [History]


Clopidogrel [Plavix] 75 mg PO DAILY 21 [History]


Fluconazole 150 mg PO DAILY PRN 21 [History]


Pantoprazole [ProTONIX***] 40 mg PO DAILY 21 [History]











Past Medical History


HEENT History: Reports: Other (See Below)


Other HEENT History: 20 lower bottom teeth extracted/sutures in place, 

placed on Amoxicillin 500mg and oxycodone/apap 5mg prn


Cardiovascular History: Reports: Hypertension, MI, PTCA, Other (See Below)


Respiratory History: Reports: COPD


Gastrointestinal History: Reports: Diverticulosis


Genitourinary History: Reports: UTI, Recurrent


Other OB/GYN History: hysterectomy, 


Musculoskeletal History: Reports: Arthritis, Neck Pain, Chronic


Psychiatric History: Reports: Addiction, Anxiety, Depression, Psych 

Hospitalization(s), Suicide Attempt


Other Psychiatric History: ETOH addiction


Endocrine/Metabolic History: Reports: Diabetes, Type II, Hypothyroidism, 

Obesity/BMI 30+





- Infectious Disease History


Infectious Disease History: Reports: Measles





- Past Surgical History


Head Surgeries/Procedures: Reports: None


HEENT Surgical History: Reports: Adenoidectomy, Tonsillectomy


Cardiovascular Surgical History: Reports: Coronary Artery Stent


Respiratory Surgical History: Reports: None


GI Surgical History: Reports: Cholecystectomy, Colonoscopy, EGD


Female  Surgical History: Reports: Hysterectomy, Salpingo-Oophorectomy, Tubal 

Ligation, Other (See Below)


Other Female  Surgeries/Procedures: rectocele


Endocrine Surgical History: Reports: Other (See Below)


Other Endocrine Surgeries/Procedures: blood sugar reader right upper/arm





Social & Family History





- Family History


Family Medical History: No Pertinent Family History





- Caffeine Use


Caffeine Use: Reports: Coffee


Caffeine Use Comment: 1-2 diet sodas daily





ED ROS GENERAL





- Review of Systems


Review Of Systems: See Below


Constitutional: Reports: No Symptoms


HEENT: Reports: No Symptoms


Respiratory: Reports: No Symptoms


Cardiovascular: Reports: Chest Pain, Palpitations


Endocrine: Reports: No Symptoms


GI/Abdominal: Reports: No Symptoms


: Reports: No Symptoms


Musculoskeletal: Reports: No Symptoms


Skin: Reports: No Symptoms


Neurological: Reports: No Symptoms


Psychiatric: Reports: No Symptoms


Hematologic/Lymphatic: Reports: No Symptoms


Immunologic: Reports: No Symptoms





ED EXAM, GENERAL





- Physical Exam


Exam: See Below


Exam Limited By: Uncooperative


General Appearance: WD/WN, Anxious, Other (alert, nonill, anxious)


Ears: Hearing Grossly Normal


Nose: Normal Inspection


Throat/Mouth: Normal Inspection, Normal Voice, No Airway Compromise


Head: Atraumatic


Neck: Normal Inspection, Supple


Respiratory/Chest: No Respiratory Distress, Lungs Clear, No Accessory Muscle 

Use, Chest Non-Tender


Cardiovascular: Regular Rate, Rhythm, No Murmur, Other (trace pretib edema b/l)


GI/Abdominal: Soft, Non-Tender, No Distention


Back Exam: Normal Inspection, Full Range of Motion


Extremities: Normal Inspection, Normal Range of Motion, Slow Capillary Refill


Neurological: Alert, Oriented, CN II-XII Intact, Normal Cognition, No 

Motor/Sensory Deficits


Psychiatric: Normal Affect, Normal Mood


Skin Exam: Warm, Dry, Intact, Normal Color, No Rash


Lymphatic: No Adenopathy


  ** #1 Interpretation


EKG Interpretation Comments: 





LBBB, no change c/e 21, no ST change





Course





- Orders/Labs/Meds


Labs: 


                                Laboratory Tests











  21 Range/Units





  17:25 17:25 17:25 


 


WBC  6.1    (3.0-10.3)  x10-3/uL


 


RBC  4.30    (3.60-5.20)  x10(6)uL


 


Hgb  12.4    (11.4-15.5)  g/dL


 


Hct  37.5    (34.2-48.2)  %


 


MCV  87.2    (76.7-100.5)  fL


 


MCH  28.9    (23.9-33.9)  pg


 


MCHC  33.2    (31.9-34.8)  g/dL


 


RDW  13.3    (12.3-16.5)  %


 


Plt Count  202    (151-488)  x10(3)uL


 


MPV  7.9    (7.1-12.4)  fL


 


Neut % (Auto)  56.6    (30.8-76.2)  %


 


Lymph % (Auto)  33.0    (18.4-52.1)  %


 


Mono % (Auto)  6.8    (4.4-15.7)  %


 


Eos % (Auto)  3.1    (0.6-8.1)  %


 


Baso % (Auto)  0.5    (0.2-1.5)  %


 


Neut # (Auto)  3.4    (1.5-6.3)  x10-3/uL


 


Lymph # (Auto)  2.0    (1.0-4.4)  x10-3/uL


 


Mono # (Auto)  0.4    (0.3-1.0)  x10-3/uL


 


Eos # (Auto)  0.2    (0.0-0.8)  x10-3/uL


 


Baso # (Auto)  0.0    (0.0-0.1)  x10-3/uL


 


Sodium   134 L   (135-145)  mmol/L


 


Potassium   4.5   (3.5-5.3)  mmol/L


 


Chloride   96 L   (100-110)  mmol/L


 


Carbon Dioxide   29   (21-32)  mmol/L


 


BUN   13   (7-18)  mg/dL


 


Creatinine   1.2 H   (0.55-1.02)  mg/dL


 


Est Cr Clr Drug Dosing   TNP   


 


Estimated GFR (MDRD)   46 L   (>60)  


 


BUN/Creatinine Ratio   10.8   (9-20)  


 


Glucose   347 H   ()  mg/dL


 


Calcium   8.7   (8.6-10.2)  mg/dL


 


Total Bilirubin   0.3   (0.1-1.3)  mg/dL


 


AST   35 H   (5-25)  IU/L


 


ALT   33   (12-36)  U/L


 


Alkaline Phosphatase   139 H   ()  IU/L


 


Troponin I    8.2  (4.0-60.3)  pg/mL


 


C-Reactive Protein    1.4 H  (0.5-0.9)  mg/dL


 


Total Protein   8.4 H   (6.0-8.0)  g/dL


 


Albumin   3.0 L   (3.2-4.6)  g/dL


 


Globulin   5.4   g/dL


 


Albumin/Globulin Ratio   0.6   


 


Urine Color     (YELLOW)  


 


Urine Appearance     (CLEAR)  


 


Urine pH     (5.0-6.5)  


 


Ur Specific Gravity     (1.010-1.025)  


 


Urine Protein     (NEGATIVE)  mg/dL


 


Urine Glucose (UA)     (NORMAL)  mg/dL


 


Urine Ketones     (NEGATIVE)  mg/dL


 


Urine Occult Blood     (NEGATIVE)  


 


Urine Nitrite     (NEGATIVE)  


 


Urine Bilirubin     (NEGATIVE)  


 


Urine Urobilinogen     (NEGATIVE)  mg/dL


 


Ur Leukocyte Esterase     (NEGATIVE)  


 


Urine RBC     (0-5)  


 


Urine WBC     (0-5)  


 


Ur Epithelial Cells     


 


Urine Bacteria     (NS)  














  21 Range/Units





  18:45 


 


WBC   (3.0-10.3)  x10-3/uL


 


RBC   (3.60-5.20)  x10(6)uL


 


Hgb   (11.4-15.5)  g/dL


 


Hct   (34.2-48.2)  %


 


MCV   (76.7-100.5)  fL


 


MCH   (23.9-33.9)  pg


 


MCHC   (31.9-34.8)  g/dL


 


RDW   (12.3-16.5)  %


 


Plt Count   (151-488)  x10(3)uL


 


MPV   (7.1-12.4)  fL


 


Neut % (Auto)   (30.8-76.2)  %


 


Lymph % (Auto)   (18.4-52.1)  %


 


Mono % (Auto)   (4.4-15.7)  %


 


Eos % (Auto)   (0.6-8.1)  %


 


Baso % (Auto)   (0.2-1.5)  %


 


Neut # (Auto)   (1.5-6.3)  x10-3/uL


 


Lymph # (Auto)   (1.0-4.4)  x10-3/uL


 


Mono # (Auto)   (0.3-1.0)  x10-3/uL


 


Eos # (Auto)   (0.0-0.8)  x10-3/uL


 


Baso # (Auto)   (0.0-0.1)  x10-3/uL


 


Sodium   (135-145)  mmol/L


 


Potassium   (3.5-5.3)  mmol/L


 


Chloride   (100-110)  mmol/L


 


Carbon Dioxide   (21-32)  mmol/L


 


BUN   (7-18)  mg/dL


 


Creatinine   (0.55-1.02)  mg/dL


 


Est Cr Clr Drug Dosing   


 


Estimated GFR (MDRD)   (>60)  


 


BUN/Creatinine Ratio   (9-20)  


 


Glucose   ()  mg/dL


 


Calcium   (8.6-10.2)  mg/dL


 


Total Bilirubin   (0.1-1.3)  mg/dL


 


AST   (5-25)  IU/L


 


ALT   (12-36)  U/L


 


Alkaline Phosphatase   ()  IU/L


 


Troponin I   (4.0-60.3)  pg/mL


 


C-Reactive Protein   (0.5-0.9)  mg/dL


 


Total Protein   (6.0-8.0)  g/dL


 


Albumin   (3.2-4.6)  g/dL


 


Globulin   g/dL


 


Albumin/Globulin Ratio   


 


Urine Color  Yellow  (YELLOW)  


 


Urine Appearance  Clear  (CLEAR)  


 


Urine pH  5.0  (5.0-6.5)  


 


Ur Specific Gravity  1.025  (1.010-1.025)  


 


Urine Protein  100 H  (NEGATIVE)  mg/dL


 


Urine Glucose (UA)  >1000 H  (NORMAL)  mg/dL


 


Urine Ketones  Negative  (NEGATIVE)  mg/dL


 


Urine Occult Blood  Negative  (NEGATIVE)  


 


Urine Nitrite  Negative  (NEGATIVE)  


 


Urine Bilirubin  Negative  (NEGATIVE)  


 


Urine Urobilinogen  Normal  (NEGATIVE)  mg/dL


 


Ur Leukocyte Esterase  Negative  (NEGATIVE)  


 


Urine RBC  0-5  (0-5)  


 


Urine WBC  0-5  (0-5)  


 


Ur Epithelial Cells  Occasional  


 


Urine Bacteria  Rare H  (NS)  











Meds: 


Medications














Discontinued Medications














Generic Name Dose Route Start Last Admin





  Trade Name Freq  PRN Reason Stop Dose Admin


 


Al Hydroxide/Mg Hydroxide 30  0 ml  21 17:12  21 18:00





  ml/ Lidocaine HCl 15 ml  PO  21 17:13  45 ml





  ONETIME ONE   Administration














- Re-Assessments/Exams


Free Text/Narrative Re-Assessment/Exam: 





21 19:10


labs neg





glucose 347 and high





TP 8.4 yet no clinical evidence of infection





alb 3.0





some improvement with GI cocktail





trop and EKG neg, pt is worried, which is as much concern as any





Departure





- Departure


Time of Disposition: 19:07


Disposition: Home, Self-Care 01


Condition: Good


Clinical Impression: 


 Non-cardiac chest pain, GERD (gastroesophageal reflux disease)





Instructions:  Chest Wall Pain, Gastroesophageal Reflux Disease, Adult


Additional Instructions: 


Continue your regular medications.





For acid reflux, take liquid antacid 30 ml every 4 hours as needed.





For additional chest discomfort, use moist heat for 10 minutes every 2 hours as 

needed.





Get adequate rest.





Continue usual activities.





See Chance Ochoa in 4 days as scheduled.





Call or return to ED if there are additional concerns.

## 2021-06-23 ENCOUNTER — HOSPITAL ENCOUNTER (EMERGENCY)
Dept: HOSPITAL 7 - FB.ED | Age: 61
Discharge: HOME | End: 2021-06-23
Payer: COMMERCIAL

## 2021-06-23 VITALS — HEART RATE: 109 BPM | DIASTOLIC BLOOD PRESSURE: 93 MMHG | SYSTOLIC BLOOD PRESSURE: 169 MMHG

## 2021-06-23 DIAGNOSIS — J44.9: ICD-10-CM

## 2021-06-23 DIAGNOSIS — Z88.6: ICD-10-CM

## 2021-06-23 DIAGNOSIS — M25.551: ICD-10-CM

## 2021-06-23 DIAGNOSIS — Z88.8: ICD-10-CM

## 2021-06-23 DIAGNOSIS — Z79.899: ICD-10-CM

## 2021-06-23 DIAGNOSIS — K21.9: Primary | ICD-10-CM

## 2021-06-23 DIAGNOSIS — Z79.4: ICD-10-CM

## 2021-06-23 DIAGNOSIS — E03.9: ICD-10-CM

## 2021-06-23 DIAGNOSIS — I25.2: ICD-10-CM

## 2021-06-23 DIAGNOSIS — I10: ICD-10-CM

## 2021-06-23 DIAGNOSIS — Z88.5: ICD-10-CM

## 2021-06-23 DIAGNOSIS — E66.9: ICD-10-CM

## 2021-06-23 DIAGNOSIS — M25.552: ICD-10-CM

## 2021-06-23 DIAGNOSIS — Z79.82: ICD-10-CM

## 2021-06-23 NOTE — PCM.EKG
** #1 Interpretation


EKG Date: 06/23/21


Time: 08:22


Rhythm: Other (sinus tach)


Rate (Beats/Min): 108


Axis: Normal


QRS: LBBB


ST-T: Normal


QT: Normal


Comparison: No Change


EKG Interpretation Comments: 





Sinus Tach


LBBB

## 2021-06-23 NOTE — EDM.PDOC
ED HPI GENERAL MEDICAL PROBLEM





- General


Chief Complaint: General


Stated Complaint: TACHYCARDIA


Time Seen by Provider: 21 08:35


Source of Information: Reports: Patient, Family


History Limitations: Reports: No Limitations





- History of Present Illness


INITIAL COMMENTS - FREE TEXT/NARRATIVE: 





Patient presented to the ED from the cardiac rehab because of tachycardia-HR of 

. there is no chest pain, dizziness, dyspnea. She also c/o epigastric 

pain, burning type for 1 week. She also mentioned that lately she has been havin

bilateral hip pain which is chronic and is taking tylenol. there is no recent 

trauma or injury.


  ** Chest


Pain Score (Numeric/FACES): 4





- Related Data


                                    Allergies











Allergy/AdvReac Type Severity Reaction Status Date / Time


 


cyclobenzaprine HCl Allergy Mild Irritabilit Verified 21 19:50





[From Flexeril]   y  


 


ibuprofen Allergy Mild Hives Verified 21 19:50


 


morphine Allergy Mild Rash Verified 21 19:50


 


codeine Allergy Unknown Chest Verified 21 19:50





   Tightness  


 


tramadol Allergy  Other Verified 21 19:50











Home Meds: 


                                    Home Meds





Aspirin 81 mg PO DAILY 11/14/15 [History]


Insulin Detemir [Levemir Flextouch] 80 unit SQ BEDTIME 11/14/15 [History]


Lisinopril 20 mg PO DAILY 11/14/15 [History]


Insulin Aspart [NovoLOG] 10 units SQ BID PRN 20 [History]


Loratadine 10 mg PO DAILY 20 [History]


Metoprolol Succinate 100 mg PO DAILY 20 [History]


Clopidogrel [Plavix] 75 mg PO DAILY 21 [History]


Fluconazole 150 mg PO DAILY PRN 21 [History]


Pantoprazole [ProTONIX***] 40 mg PO DAILY 21 [History]


Acetaminophen/oxyCODONE [Percocet 325-5 MG] 1 each PO Q4H PRN #10 tab 21 

[Rx]











Past Medical History


HEENT History: Reports: Other (See Below)


Other HEENT History: 20 lower bottom teeth extracted/sutures in place, 

placed on Amoxicillin 500mg and oxycodone/apap 5mg prn


Cardiovascular History: Reports: Hypertension, MI, PTCA, Stents, Other (See 

Below)


Respiratory History: Reports: COPD


Gastrointestinal History: Reports: Diverticulosis


Genitourinary History: Reports: UTI, Recurrent


Other OB/GYN History: hysterectomy, 


Musculoskeletal History: Reports: Arthritis, Neck Pain, Chronic


Psychiatric History: Reports: Addiction, Anxiety, Depression, Psych 

Hospitalization(s), Suicide Attempt


Other Psychiatric History: ETOH addiction


Endocrine/Metabolic History: Reports: Diabetes, Type II, Hypothyroidism, 

Obesity/BMI 30+





- Infectious Disease History


Infectious Disease History: Reports: Measles





- Past Surgical History


Head Surgeries/Procedures: Reports: None


HEENT Surgical History: Reports: Adenoidectomy, Tonsillectomy


Cardiovascular Surgical History: Reports: Coronary Artery Stent


Respiratory Surgical History: Reports: None


GI Surgical History: Reports: Cholecystectomy, Colonoscopy, EGD


Female  Surgical History: Reports: Hysterectomy, Salpingo-Oophorectomy, Tubal 

Ligation, Other (See Below)


Other Female  Surgeries/Procedures: rectocele


Endocrine Surgical History: Reports: Other (See Below)


Other Endocrine Surgeries/Procedures: blood sugar reader right upper/arm





Social & Family History





- Family History


Family Medical History: No Pertinent Family History





- Caffeine Use


Caffeine Use: Reports: None


Caffeine Use Comment: 1-2 diet sodas daily





ED ROS GENERAL





- Review of Systems


Review Of Systems: See Below


Constitutional: Reports: No Symptoms


HEENT: Reports: No Symptoms


Respiratory: Reports: No Symptoms


Cardiovascular: Reports: Palpitations


Endocrine: Reports: No Symptoms, Polyuria


GI/Abdominal: Reports: Abdominal Pain


: Reports: No Symptoms


Musculoskeletal: Reports: Joint Pain


Neurological: Reports: No Symptoms


Psychiatric: Reports: No Symptoms





ED EXAM, GENERAL





- Physical Exam


Exam: See Below


Exam Limited By: No Limitations


General Appearance: Alert, No Apparent Distress


Ears: Normal External Exam, Normal Canal


Nose: Normal Inspection, Normal Mucosa, No Blood


Throat/Mouth: Normal Inspection, Normal Lips, Normal Teeth, Normal Gums


Head: Atraumatic, Normocephalic


Neck: Normal Inspection, Supple, Non-Tender, Full Range of Motion


Respiratory/Chest: No Respiratory Distress, Lungs Clear, Normal Breath Sounds


Cardiovascular: Normal Peripheral Pulses, Tachycardia


GI/Abdominal: Normal Bowel Sounds, Soft, Non-Tender


Back Exam: Normal Inspection


Extremities: Normal Inspection, Normal Range of Motion, Non-Tender, Other 

(bilatera hip T)


Neurological: Alert, Oriented, CN II-XII Intact


Psychiatric: Normal Affect, Normal Mood





Course





- Vital Signs


Text/Narrative:: 





GI cocktail 1 po x1


Percocet 5 mg, 2 po x1


Last Recorded V/S: 


                                Last Vital Signs











Temp      


 


Pulse  109 H  21 08:30


 


Resp  18   21 08:30


 


BP  169/93 H  21 08:30


 


Pulse Ox  18 L  21 08:30














- Orders/Labs/Meds


Labs: 


                                Laboratory Tests











  21 Range/Units





  08:40 


 


POC Glucose  235 H  ()  mg/dL











Meds: 


Medications














Discontinued Medications














Generic Name Dose Route Start Last Admin





  Trade Name Freq  PRN Reason Stop Dose Admin


 


Al Hydroxide/Mg Hydroxide 15  0 ml  21 10:01  21 10:06





  ml/ Lidocaine HCl 15 ml  PO  21 10:02  30 ml





  ONETIME ONE   Administration


 


Oxycodone/Acetaminophen  2 tab  21 08:59  21 09:07





  Acetaminophen/Oxycodone 325-5 Mg Tab  PO   2 tab





  ONETIME PRN   Administration





  Pain  














Departure





- Departure


Time of Disposition: 11:00


Disposition: Home, Self-Care 01


Condition: Good


Clinical Impression: 


 GERD (gastroesophageal reflux disease), Hip pain








- Discharge Information


Prescriptions: 


Acetaminophen/oxyCODONE [Percocet 325-5 MG] 1 each PO Q4H PRN #10 tab


 PRN Reason: Pain


Instructions:  Hip Pain, Food Choices for Gastroesophageal Reflux Disease, 

Child, Easy-to-Read


Referrals: 


Strand,Duane, MD [Primary Care Provider] - 


Forms:  ED Department Discharge


Additional Instructions: 


Please read discharge instructions on GERD-take your newly prescribed medicine 

daily


Read the list of food and beverages that can cause flare up of acid reflux


Percocet/oxycodone 5 mg, 1 tablet every 4 hours as needed for pain


Follow up as needed





Sepsis Event Note (ED)





- Evaluation


Sepsis Screening Result: No Definite Risk





- Focused Exam


Vital Signs: 


                                   Vital Signs











  Pulse Resp BP Pulse Ox


 


 21 08:30  109 H  18  169/93 H  18 L

## 2021-09-27 ENCOUNTER — HOSPITAL ENCOUNTER (EMERGENCY)
Dept: HOSPITAL 7 - FB.ED | Age: 61
Discharge: HOME | End: 2021-09-27
Payer: COMMERCIAL

## 2021-09-27 VITALS — DIASTOLIC BLOOD PRESSURE: 79 MMHG | HEART RATE: 98 BPM | SYSTOLIC BLOOD PRESSURE: 165 MMHG

## 2021-09-27 DIAGNOSIS — J44.9: ICD-10-CM

## 2021-09-27 DIAGNOSIS — E11.9: ICD-10-CM

## 2021-09-27 DIAGNOSIS — Z79.899: ICD-10-CM

## 2021-09-27 DIAGNOSIS — Z79.02: ICD-10-CM

## 2021-09-27 DIAGNOSIS — Z88.8: ICD-10-CM

## 2021-09-27 DIAGNOSIS — R16.0: Primary | ICD-10-CM

## 2021-09-27 DIAGNOSIS — Z79.4: ICD-10-CM

## 2021-09-27 DIAGNOSIS — Z79.82: ICD-10-CM

## 2021-09-27 DIAGNOSIS — I10: ICD-10-CM

## 2021-09-27 DIAGNOSIS — Z87.891: ICD-10-CM

## 2021-09-27 DIAGNOSIS — E03.9: ICD-10-CM

## 2021-09-27 DIAGNOSIS — Z88.5: ICD-10-CM

## 2021-09-27 DIAGNOSIS — I25.2: ICD-10-CM

## 2021-09-27 DIAGNOSIS — E66.9: ICD-10-CM

## 2021-09-27 RX ADMIN — Medication PRN ML: at 22:44

## 2021-09-27 RX ADMIN — Medication PRN ML: at 19:10

## 2021-09-27 NOTE — EDM.PDOC
ED HPI GENERAL MEDICAL PROBLEM





- General


Chief Complaint: Abdominal Pain


Stated Complaint: STOMACHE ACHE


Time Seen by Provider: 21 19:00


Source of Information: Reports: Patient, Family


History Limitations: Reports: No Limitations





- History of Present Illness


INITIAL COMMENTS - FREE TEXT/NARRATIVE: 





Patient presented to the ED because of abdominal pain over the RUQ and epig

astric area. The pain is sharp, constant,10/10 with associated nausea but no 

vomiting. There is no changes in bowel movements or urinary symptoms. There is 

no fever, chills, cough/cold symptoms. 


  ** Right Upper Abdominal


Pain Score (Numeric/FACES): 7





- Related Data


                                    Allergies











Allergy/AdvReac Type Severity Reaction Status Date / Time


 


cyclobenzaprine HCl Allergy Mild Irritabilit Verified 21 20:23





[From Flexeril]   y  


 


ibuprofen Allergy Mild Hives Verified 21 20:23


 


morphine Allergy Mild Rash Verified 21 19:50


 


codeine Allergy Unknown Chest Verified 21 20:23





   Tightness  


 


tramadol Allergy  Other Verified 21 20:23











Home Meds: 


                                    Home Meds





Aspirin 81 mg PO DAILY 11/14/15 [History]


Insulin Detemir [Levemir Flextouch] 80 unit SQ BEDTIME 11/14/15 [History]


Lisinopril 20 mg PO DAILY 11/14/15 [History]


Insulin Aspart [NovoLOG] 10 units SQ BID PRN 20 [History]


Loratadine 10 mg PO DAILY 20 [History]


Metoprolol Succinate 100 mg PO DAILY 20 [History]


Clopidogrel [Plavix] 75 mg PO DAILY 21 [History]


Fluconazole 150 mg PO DAILY PRN 21 [History]


Pantoprazole [ProTONIX***] 40 mg PO DAILY 21 [History]


Acetaminophen/oxyCODONE [Percocet 325-5 MG] 1 each PO Q4H PRN #10 tab 21 

[Rx]


Acetaminophen/HYDROcodone [HYDROcodone-Acetaminophen 5-325 MG *] 1 tab PO Q4H 

PRN #15 tab 21 [Rx]


Ondansetron [Zofran ODT] 4 mg PO Q4H PRN #5 tab.dis 21 [Rx]











Past Medical History


HEENT History: Reports: Other (See Below)


Other HEENT History: 12 lower bottom teeth extracted/sutures in place, 

placed on Amoxicillin 500mg and oxycodone/apap 5mg prn


Cardiovascular History: Reports: Hypertension, MI, PTCA, Stents, Other (See 

Below)


Respiratory History: Reports: COPD


Gastrointestinal History: Reports: Diverticulosis


Genitourinary History: Reports: UTI, Recurrent


Other OB/GYN History: hysterectomy, 


Musculoskeletal History: Reports: Arthritis, Neck Pain, Chronic


Psychiatric History: Reports: Addiction, Anxiety, Depression, Psych 

Hospitalization(s), Suicide Attempt


Other Psychiatric History: ETOH addiction


Endocrine/Metabolic History: Reports: Diabetes, Type II, Hypothyroidism, 

Obesity/BMI 30+





- Infectious Disease History


Infectious Disease History: Reports: Measles





- Past Surgical History


Head Surgeries/Procedures: Reports: None


HEENT Surgical History: Reports: Adenoidectomy, Tonsillectomy


Cardiovascular Surgical History: Reports: Coronary Artery Stent


Respiratory Surgical History: Reports: None


GI Surgical History: Reports: Cholecystectomy, Colonoscopy, EGD


Female  Surgical History: Reports: Hysterectomy, Salpingo-Oophorectomy, Tubal 

Ligation, Other (See Below)


Other Female  Surgeries/Procedures: rectocele


Endocrine Surgical History: Reports: Other (See Below)


Other Endocrine Surgeries/Procedures: blood sugar reader right upper/arm





Social & Family History





- Family History


Family Medical History: No Pertinent Family History





- Tobacco Use


Tobacco Use Status *Q: Former Tobacco User


Used Tobacco, but Quit: Yes


Month/Year Tobacco Last Used: 2015





- Caffeine Use


Caffeine Use: Reports: None


Caffeine Use Comment: 1-2 diet sodas daily





- Recreational Drug Use


Recreational Drug Use: No





ED ROS GENERAL





- Review of Systems


Review Of Systems: See Below


Constitutional: Reports: No Symptoms


HEENT: Reports: No Symptoms


Respiratory: Reports: No Symptoms


Cardiovascular: Reports: No Symptoms


Endocrine: Reports: No Symptoms


GI/Abdominal: Reports: Abdominal Pain, Nausea


: Reports: No Symptoms


Musculoskeletal: Reports: No Symptoms


Skin: Reports: No Symptoms


Neurological: Reports: No Symptoms





ED EXAM, GI/ABD





- Physical Exam


Exam: See Below


Exam Limited By: No Limitations


General Appearance: Alert, No Apparent Distress


Ears: Normal External Exam, Normal Canal


Nose: Normal Inspection, Normal Mucosa


Throat/Mouth: Normal Inspection, Normal Lips, Normal Teeth


Head: Atraumatic, Normocephalic


Neck: Normal Inspection, Supple, Non-Tender, Full Range of Motion


Respiratory/Chest: No Respiratory Distress, Lungs Clear, Normal Breath Sounds


Cardiovascular: Normal Peripheral Pulses, Regular Rate, Rhythm, No Edema, No 

Gallop, No JVD, No Murmur, No Rub


GI/Abdominal Exam: Normal Bowel Sounds, Soft, Other (RUQ tenderness and 

epigastric tenderness)


Back Exam: Normal Inspection, Full Range of Motion


Extremities: Normal Inspection, Normal Range of Motion, Non-Tender, No Pedal 

Edema, Normal Capillary Refill


Neurological: Alert, Oriented, CN II-XII Intact, Normal Cognition, Normal Gait, 

Normal Reflexes, No Motor/Sensory Deficits





Course





- Vital Signs


Text/Narrative:: 





Lab and Ct result was reviewed and discussed with patient


Last Recorded V/S: 


                                Last Vital Signs











Temp  36.4 C   21 18:54


 


Pulse  98   21 23:15


 


Resp  18   21 23:15


 


BP  165/79 H  21 23:15


 


Pulse Ox  96   21 23:15














- Orders/Labs/Meds


Labs: 


                                Laboratory Tests











  21 Range/Units





  19:30 19:30 19:30 


 


WBC  9.8    (3.0-10.3)  x10-3/uL


 


RBC  4.65    (3.60-5.20)  x10(6)uL


 


Hgb  12.8    (11.4-15.5)  g/dL


 


Hct  39.3    (34.2-48.2)  %


 


MCV  84.6    (76.7-100.5)  fL


 


MCH  27.5    (23.9-33.9)  pg


 


MCHC  32.4    (31.9-34.8)  g/dL


 


RDW  13.7    (12.3-16.5)  %


 


Plt Count  279    (151-488)  x10(3)uL


 


MPV  7.8    (7.1-12.4)  fL


 


Neut % (Auto)  54.9    (30.8-76.2)  %


 


Lymph % (Auto)  35.6    (18.4-52.1)  %


 


Mono % (Auto)  6.8    (4.4-15.7)  %


 


Eos % (Auto)  2.2    (0.6-8.1)  %


 


Baso % (Auto)  0.5    (0.2-1.5)  %


 


Neut # (Auto)  5.4    (1.5-6.3)  x10-3/uL


 


Lymph # (Auto)  3.5    (1.0-4.4)  x10-3/uL


 


Mono # (Auto)  0.7    (0.3-1.0)  x10-3/uL


 


Eos # (Auto)  0.2    (0.0-0.8)  x10-3/uL


 


Baso # (Auto)  0.1    (0.0-0.1)  x10-3/uL


 


Sodium   132 L   (135-145)  mmol/L


 


Potassium   4.2   (3.5-5.3)  mmol/L


 


Chloride   96 L   (100-110)  mmol/L


 


Carbon Dioxide   25   (21-32)  mmol/L


 


BUN   29 H D   (7-18)  mg/dL


 


Creatinine   1.5 H   (0.55-1.02)  mg/dL


 


Est Cr Clr Drug Dosing   33.58   mL/min


 


Estimated GFR (MDRD)   35 L   (>60)  


 


BUN/Creatinine Ratio   19.3   (9-20)  


 


Glucose   432 H* D   ()  mg/dL


 


POC Glucose     ()  mg/dL


 


Calcium   8.5 L   (8.6-10.2)  mg/dL


 


Total Bilirubin   0.5   (0.1-1.3)  mg/dL


 


AST   34 H   (5-25)  IU/L


 


ALT   26  D   (12-36)  U/L


 


Alkaline Phosphatase   129 H   ()  IU/L


 


Troponin I     (4.0-60.3)  pg/mL


 


Total Protein   8.9 H   (6.0-8.0)  g/dL


 


Albumin   3.3   (3.2-4.6)  g/dL


 


Globulin   5.6   g/dL


 


Albumin/Globulin Ratio   0.6   


 


Amylase   34   ()  U/L


 


Lipase    113  ()  U/L


 


Urine Color     (YELLOW)  


 


Urine Appearance     (CLEAR)  


 


Urine pH     (5.0-6.5)  


 


Ur Specific Gravity     (1.010-1.025)  


 


Urine Protein     (NEGATIVE)  mg/dL


 


Urine Glucose (UA)     (NORMAL)  mg/dL


 


Urine Ketones     (NEGATIVE)  mg/dL


 


Urine Occult Blood     (NEGATIVE)  


 


Urine Nitrite     (NEGATIVE)  


 


Urine Bilirubin     (NEGATIVE)  


 


Urine Urobilinogen     (NEGATIVE)  mg/dL


 


Ur Leukocyte Esterase     (NEGATIVE)  


 


U Hyaline Cast (Auto)     (NS)  


 


Urine RBC     (0-5)  


 


Urine WBC     (0-5)  


 


Ur Squamous Epith Cells     (NS,R,O)  


 


Urine Bacteria     (NS)  














  21 Range/Units





  19:30 19:35 21:30 


 


WBC     (3.0-10.3)  x10-3/uL


 


RBC     (3.60-5.20)  x10(6)uL


 


Hgb     (11.4-15.5)  g/dL


 


Hct     (34.2-48.2)  %


 


MCV     (76.7-100.5)  fL


 


MCH     (23.9-33.9)  pg


 


MCHC     (31.9-34.8)  g/dL


 


RDW     (12.3-16.5)  %


 


Plt Count     (151-488)  x10(3)uL


 


MPV     (7.1-12.4)  fL


 


Neut % (Auto)     (30.8-76.2)  %


 


Lymph % (Auto)     (18.4-52.1)  %


 


Mono % (Auto)     (4.4-15.7)  %


 


Eos % (Auto)     (0.6-8.1)  %


 


Baso % (Auto)     (0.2-1.5)  %


 


Neut # (Auto)     (1.5-6.3)  x10-3/uL


 


Lymph # (Auto)     (1.0-4.4)  x10-3/uL


 


Mono # (Auto)     (0.3-1.0)  x10-3/uL


 


Eos # (Auto)     (0.0-0.8)  x10-3/uL


 


Baso # (Auto)     (0.0-0.1)  x10-3/uL


 


Sodium     (135-145)  mmol/L


 


Potassium     (3.5-5.3)  mmol/L


 


Chloride     (100-110)  mmol/L


 


Carbon Dioxide     (21-32)  mmol/L


 


BUN     (7-18)  mg/dL


 


Creatinine     (0.55-1.02)  mg/dL


 


Est Cr Clr Drug Dosing     mL/min


 


Estimated GFR (MDRD)     (>60)  


 


BUN/Creatinine Ratio     (9-20)  


 


Glucose     ()  mg/dL


 


POC Glucose    313 H  ()  mg/dL


 


Calcium     (8.6-10.2)  mg/dL


 


Total Bilirubin     (0.1-1.3)  mg/dL


 


AST     (5-25)  IU/L


 


ALT     (12-36)  U/L


 


Alkaline Phosphatase     ()  IU/L


 


Troponin I  17.3    (4.0-60.3)  pg/mL


 


Total Protein     (6.0-8.0)  g/dL


 


Albumin     (3.2-4.6)  g/dL


 


Globulin     g/dL


 


Albumin/Globulin Ratio     


 


Amylase     ()  U/L


 


Lipase     ()  U/L


 


Urine Color   Yellow   (YELLOW)  


 


Urine Appearance   Clear   (CLEAR)  


 


Urine pH   5.0   (5.0-6.5)  


 


Ur Specific Gravity   1.025   (1.010-1.025)  


 


Urine Protein   500 H   (NEGATIVE)  mg/dL


 


Urine Glucose (UA)   >1000 H   (NORMAL)  mg/dL


 


Urine Ketones   Negative   (NEGATIVE)  mg/dL


 


Urine Occult Blood   Negative   (NEGATIVE)  


 


Urine Nitrite   Negative   (NEGATIVE)  


 


Urine Bilirubin   Negative   (NEGATIVE)  


 


Urine Urobilinogen   Normal   (NEGATIVE)  mg/dL


 


Ur Leukocyte Esterase   Small H   (NEGATIVE)  


 


U Hyaline Cast (Auto)   Many H   (NS)  


 


Urine RBC   0-5   (0-5)  


 


Urine WBC   0-5   (0-5)  


 


Ur Squamous Epith Cells   Occasional   (NS,R,O)  


 


Urine Bacteria   Rare H   (NS)  














  / Range/Units





  22:27 


 


WBC   (3.0-10.3)  x10-3/uL


 


RBC   (3.60-5.20)  x10(6)uL


 


Hgb   (11.4-15.5)  g/dL


 


Hct   (34.2-48.2)  %


 


MCV   (76.7-100.5)  fL


 


MCH   (23.9-33.9)  pg


 


MCHC   (31.9-34.8)  g/dL


 


RDW   (12.3-16.5)  %


 


Plt Count   (151-488)  x10(3)uL


 


MPV   (7.1-12.4)  fL


 


Neut % (Auto)   (30.8-76.2)  %


 


Lymph % (Auto)   (18.4-52.1)  %


 


Mono % (Auto)   (4.4-15.7)  %


 


Eos % (Auto)   (0.6-8.1)  %


 


Baso % (Auto)   (0.2-1.5)  %


 


Neut # (Auto)   (1.5-6.3)  x10-3/uL


 


Lymph # (Auto)   (1.0-4.4)  x10-3/uL


 


Mono # (Auto)   (0.3-1.0)  x10-3/uL


 


Eos # (Auto)   (0.0-0.8)  x10-3/uL


 


Baso # (Auto)   (0.0-0.1)  x10-3/uL


 


Sodium   (135-145)  mmol/L


 


Potassium   (3.5-5.3)  mmol/L


 


Chloride   (100-110)  mmol/L


 


Carbon Dioxide   (21-32)  mmol/L


 


BUN   (7-18)  mg/dL


 


Creatinine   (0.55-1.02)  mg/dL


 


Est Cr Clr Drug Dosing   mL/min


 


Estimated GFR (MDRD)   (>60)  


 


BUN/Creatinine Ratio   (9-20)  


 


Glucose   ()  mg/dL


 


POC Glucose  317 H  ()  mg/dL


 


Calcium   (8.6-10.2)  mg/dL


 


Total Bilirubin   (0.1-1.3)  mg/dL


 


AST   (5-25)  IU/L


 


ALT   (12-36)  U/L


 


Alkaline Phosphatase   ()  IU/L


 


Troponin I   (4.0-60.3)  pg/mL


 


Total Protein   (6.0-8.0)  g/dL


 


Albumin   (3.2-4.6)  g/dL


 


Globulin   g/dL


 


Albumin/Globulin Ratio   


 


Amylase   ()  U/L


 


Lipase   ()  U/L


 


Urine Color   (YELLOW)  


 


Urine Appearance   (CLEAR)  


 


Urine pH   (5.0-6.5)  


 


Ur Specific Gravity   (1.010-1.025)  


 


Urine Protein   (NEGATIVE)  mg/dL


 


Urine Glucose (UA)   (NORMAL)  mg/dL


 


Urine Ketones   (NEGATIVE)  mg/dL


 


Urine Occult Blood   (NEGATIVE)  


 


Urine Nitrite   (NEGATIVE)  


 


Urine Bilirubin   (NEGATIVE)  


 


Urine Urobilinogen   (NEGATIVE)  mg/dL


 


Ur Leukocyte Esterase   (NEGATIVE)  


 


U Hyaline Cast (Auto)   (NS)  


 


Urine RBC   (0-5)  


 


Urine WBC   (0-5)  


 


Ur Squamous Epith Cells   (NS,R,O)  


 


Urine Bacteria   (NS)  











Meds: 


Medications














Discontinued Medications














Generic Name Dose Route Start Last Admin





  Trade Name Freq  PRN Reason Stop Dose Admin


 


Hydrocodone Bitart/Acetaminophen  4 tab  21 18:55 





  Acetaminophen/Hydrocodone 325-5 Mg Tab  PO  21 18:56 





  .STK-MED ONE  


 


Dextrose/Water  50 ml  21 21:26 





  50% Dextrose In Water 50 Ml Syringe  IVPUSH  





  ASDIRECTED PRN  





  Hypoglycemia  


 


Dextrose/Water  50 ml  21 21:32 





  50% Dextrose In Water 50 Ml Syringe  IVPUSH  





  ASDIRECTED PRN  





  Hypoglycemia  


 


Glucagon  1 mg  21 21:26 





  Glucagon,Human Recombinant 1 Mg Vial  IM  





  ASDIRECTED PRN  





  Hypoglycemia  


 


Glucagon  1 mg  21 21:32 





  Glucagon,Human Recombinant 1 Mg Vial  IM  





  ASDIRECTED PRN  





  Hypoglycemia  


 


Hydromorphone HCl  1 mg  21 19:26  21 19:39





  Hydromorphone 2 Mg/Ml Sdv  IVPUSH  21 19:27  1 mg





  ONETIME ONE   Administration


 


Hydromorphone HCl  1 mg  21 21:20  21 21:33





  Hydromorphone 2 Mg/Ml Sdv  IM  21 21:21  Not Given





  NOW STA  


 


Hydromorphone HCl  1 mg  21 21:22  21 21:22





  Hydromorphone 2 Mg/Ml Sdv  IVPUSH  21 21:23  1 mg





  NOW STA   Administration


 


Hydromorphone HCl  Confirm  21 21:20  21 21:33





  Hydromorphone 2 Mg/Ml Sdv  Administered  21 21:21  Not Given





  Dose  





  2 mg  





  .ROUTE  





  .STK-MED ONE  


 


Sodium Chloride  1,000 mls @ 999 mls/hr  21 19:30  21 19:39





  Normal Saline  IV   999 mls/hr





  ASDIRECTED AKANKSHA   Administration


 


Insulin Human Regular  15 unit  21 21:26  21 23:51





  Insulin Regular, Human 100 Units/Ml 3 Ml Vial  IV  21 21:27  Not Given





  ONETIME ONE  


 


Insulin Human Regular  15 unit  21 21:32  21 21:35





  Insulin Regular, Human 100 Units/Ml 3 Ml Vial  SUBCUT  21 21:33  15 

units





  NOW STA   Administration


 


Iopamidol  150 ml  21 20:21  21 20:40





  Iopamidol 755 Mg/Ml 150 Ml Bottle  IV  21 20:22  128 ml





  ONETIME ONE   Administration


 


Labetalol HCl  20 mg  21 22:30  21 22:43





  Labetalol 20 Mg/4 Ml Syringe  IVPUSH  21 22:31  20 mg





  NOW STA   Administration





  Protocol  


 


Morphine Sulfate  4 mg  21 21:55  21 22:02





  Morphine 4 Mg/Ml Vial  IVPUSH  21 21:56  Not Given





  NOW STA  


 


Morphine Sulfate  4 mg  21 21:58  21 22:00





  Morphine 2 Mg/Ml Syringe  IVPUSH  21 21:59  4 mg





  ONETIME ONE   Administration


 


Morphine Sulfate  Confirm  21 21:57  21 22:03





  Morphine 2 Mg/Ml Syringe  Administered  21 21:58  Not Given





  Dose  





  4 mg  





  .ROUTE  





  .STK-MED ONE  


 


Ondansetron HCl  4 mg  21 19:26  21 19:39





  Ondansetron 4 Mg/2 Ml Sdv  IVPUSH  21 19:27  4 mg





  NOW STA   Administration


 


Ondansetron HCl  4 mg  21 21:13  21 21:22





  Ondansetron 4 Mg/2 Ml Sdv  IVPUSH  21 21:14  4 mg





  NOW STA   Administration


 


Ondansetron HCl  16 mg  21 18:55 





  Ondansetron 4 Mg Tab.Dis  PO  21 18:56 





  .STK-MED ONE  


 


Sodium Chloride  10 ml  21 19:26  21 22:44





  Sodium Chloride 0.9% 10 Ml Syringe  FLUSH   10 ml





  ASDIRECTED PRN   Administration





  Keep Vein Open  














Departure





- Departure


Time of Disposition: 22:30


Disposition: Home, Self-Care 01


Condition: Good


Clinical Impression: 


 Abdominal pain, Liver mass








- Discharge Information


Prescriptions: 


Acetaminophen/HYDROcodone [HYDROcodone-Acetaminophen 5-325 MG *] 1 tab PO Q4H 

PRN #15 tab


 PRN Reason: Pain


Ondansetron [Zofran ODT] 4 mg PO Q4H PRN #5 tab.dis


 PRN Reason: nausea


Instructions:  Abdominal Pain, Adult, Easy-to-Read


Referrals: 


PCP,None [Primary Care Provider] - 


Forms:  ED Department Discharge


Additional Instructions: 


Please read discharge instructions on abdominal pain and liver mas


Take norco/hydrocone, 1-2 tablets every 4-6 hours as needed for pain


Zofran ODT 4 mg every 4 hours as needed for nausea


Follow up with your doctor tomorro so he can refer you to see a 

gastroenterologist/specialist for liver diseases





Sepsis Event Note (ED)





- Evaluation


Sepsis Screening Result: No Definite Risk

## 2021-12-05 ENCOUNTER — HOSPITAL ENCOUNTER (EMERGENCY)
Dept: HOSPITAL 7 - FB.ED | Age: 61
Discharge: NURSING, REHAB OR LONG-TERM CARE | End: 2021-12-05
Payer: COMMERCIAL

## 2021-12-05 VITALS — DIASTOLIC BLOOD PRESSURE: 85 MMHG | HEART RATE: 107 BPM | SYSTOLIC BLOOD PRESSURE: 155 MMHG

## 2021-12-05 DIAGNOSIS — D63.1: ICD-10-CM

## 2021-12-05 DIAGNOSIS — Z95.5: ICD-10-CM

## 2021-12-05 DIAGNOSIS — E11.22: ICD-10-CM

## 2021-12-05 DIAGNOSIS — Z88.6: ICD-10-CM

## 2021-12-05 DIAGNOSIS — I25.2: ICD-10-CM

## 2021-12-05 DIAGNOSIS — Z79.4: ICD-10-CM

## 2021-12-05 DIAGNOSIS — Z79.899: ICD-10-CM

## 2021-12-05 DIAGNOSIS — Z79.82: ICD-10-CM

## 2021-12-05 DIAGNOSIS — Z79.02: ICD-10-CM

## 2021-12-05 DIAGNOSIS — J44.9: ICD-10-CM

## 2021-12-05 DIAGNOSIS — I63.9: Primary | ICD-10-CM

## 2021-12-05 DIAGNOSIS — E83.42: ICD-10-CM

## 2021-12-05 DIAGNOSIS — D75.839: ICD-10-CM

## 2021-12-05 DIAGNOSIS — E66.9: ICD-10-CM

## 2021-12-05 DIAGNOSIS — N18.9: ICD-10-CM

## 2021-12-05 DIAGNOSIS — I12.9: ICD-10-CM

## 2021-12-05 DIAGNOSIS — Z88.8: ICD-10-CM

## 2021-12-05 DIAGNOSIS — E83.51: ICD-10-CM

## 2021-12-05 DIAGNOSIS — E03.9: ICD-10-CM

## 2021-12-05 DIAGNOSIS — Z88.5: ICD-10-CM

## 2021-12-05 PROCEDURE — 71045 X-RAY EXAM CHEST 1 VIEW: CPT

## 2021-12-05 PROCEDURE — 70450 CT HEAD/BRAIN W/O DYE: CPT

## 2021-12-05 PROCEDURE — 83970 ASSAY OF PARATHORMONE: CPT

## 2021-12-05 PROCEDURE — 84484 ASSAY OF TROPONIN QUANT: CPT

## 2021-12-05 PROCEDURE — 83735 ASSAY OF MAGNESIUM: CPT

## 2021-12-05 PROCEDURE — 70496 CT ANGIOGRAPHY HEAD: CPT

## 2021-12-05 PROCEDURE — 93005 ELECTROCARDIOGRAM TRACING: CPT

## 2021-12-05 PROCEDURE — 85610 PROTHROMBIN TIME: CPT

## 2021-12-05 PROCEDURE — 85025 COMPLETE CBC W/AUTO DIFF WBC: CPT

## 2021-12-05 PROCEDURE — 84100 ASSAY OF PHOSPHORUS: CPT

## 2021-12-05 PROCEDURE — 96365 THER/PROPH/DIAG IV INF INIT: CPT

## 2021-12-05 PROCEDURE — 99285 EMERGENCY DEPT VISIT HI MDM: CPT

## 2021-12-05 PROCEDURE — 82947 ASSAY GLUCOSE BLOOD QUANT: CPT

## 2021-12-05 PROCEDURE — 70498 CT ANGIOGRAPHY NECK: CPT

## 2021-12-05 PROCEDURE — 36415 COLL VENOUS BLD VENIPUNCTURE: CPT

## 2021-12-05 PROCEDURE — 80053 COMPREHEN METABOLIC PANEL: CPT

## 2021-12-05 PROCEDURE — 85730 THROMBOPLASTIN TIME PARTIAL: CPT

## 2021-12-05 PROCEDURE — 96375 TX/PRO/DX INJ NEW DRUG ADDON: CPT

## 2021-12-05 PROCEDURE — 96367 TX/PROPH/DG ADDL SEQ IV INF: CPT

## 2021-12-05 NOTE — EDM.PDOC
<HeathJorge cobian W - Last Filed: 21 08:48>





ED HPI GENERAL MEDICAL PROBLEM





- General


Time Seen by Provider: 21 05:45





- Related Data


                                    Allergies











Allergy/AdvReac Type Severity Reaction Status Date / Time


 


cyclobenzaprine HCl Allergy Mild Irritabilit Verified 21 05:56





[From Flexeril]   y  


 


ibuprofen Allergy Mild Hives Verified 21 05:56


 


morphine Allergy Mild Rash Verified 21 05:56


 


codeine Allergy Unknown Chest Verified 21 05:56





   Tightness  


 


tramadol Allergy  Other Verified 21 05:56











Home Meds: 


                                    Home Meds





Aspirin 81 mg PO DAILY 11/14/15 [History]


Insulin Detemir [Levemir Flextouch] 80 unit SQ BEDTIME 11/14/15 [History]


Lisinopril 30 mg PO DAILY 11/14/15 [History]


Insulin Aspart [NovoLOG] 10 units SQ BID PRN 20 [History]


Loratadine 10 mg PO DAILY 20 [History]


Metoprolol Succinate 150 mg PO DAILY 20 [History]


Clopidogrel [Plavix] 75 mg PO DAILY 21 [History]


Ondansetron [Zofran ODT] 4 mg PO Q4H PRN #5 tab.dis 21 [Rx]


ALPRAZolam [Alprazolam] 0.5 mg PO BID 21 [History]


Acetaminophen/oxyCODONE [Percocet 325-5 MG] 1 each PO Q6H PRN 21 [History]


DULoxetine [Cymbalta] 20 mg PO DAILY 21 [History]


DULoxetine [Cymbalta] 60 mg PO DAILY 21 [History]


Omeprazole 40 mg PO DAILY 21 [History]


Prochlorperazine [Compazine] 10 mg PO Q6H PRN 21 [History]


ondansetron HCL [Ondansetron HCl] 8 mg PO Q8H PRN 21 [History]


oxyCODONE 10 mg PO Q4H PRN 21 [History]











Course





- Vital Signs


Text/Narrative:: 


Review of laboratory findings shows that the patient has hypocalcemia at 5.7, 

with albumin corrected to a level of 4.4 her calcium level still 6.6.  She also 

has thrombocytosis of 746.  I called [facility] oncology and spoke with Dr. Leyva.  In consultation with oncology there is no obvious reason for the 

hypocalcemia or the enhanced thrombocytosis.  She did have mild thrombocytosis 

and mild anemia at her last visit with [facility].  Oncology advised to 

continue treatment as above here at [facility]. 





Further laboratory analysis showed that the patient has magnesium 0.6 and 

phosphorus is mildly elevated at 5.0.  Patient given 2 g calcium gluconate, 4 g 

magnesium sulfate IV.


Patient continues to have increasing hypertension secondary to medical condition

 and likely secondary to calcium infusion.  Patient will be given 20 mg 

labetalol IV and closely monitored.





Radiology called and informed me that the patient has a distal right ICA 

occlusion.  She has poor collaterals but appears to have perfusion from the ri

t posterior circulation.  There is also an occlusion of the right A1 PAWEL 

segment.





In consultation with [facility] 1 call and Dr. Hernandez from neurology 

patient will be transferred to [place], [place] for neuro 

ICU observation.





Departure





- Departure


Time of Disposition: 08:52


Disposition: DC/Tfer to Acute Hospital 02


Condition: Undetermined


Clinical Impression: 


 CVA (cerebral vascular accident), Hypocalcemia, Hypomagnesemia, Hypertension, 

Liver cancer, Thrombocytosis, Anemia, Chronic kidney disease, Obesity








- Discharge Information


*PRESCRIPTION DRUG MONITORING PROGRAM REVIEWED*: Not Applicable


*COPY OF PRESCRIPTION DRUG MONITORING REPORT IN PATIENT BETHEL: Not Applicable


Instructions:  Warning Signs of a Stroke, Hypertension, Adult, Easy-to-Read, 

Chronic Kidney Disease, Adult, Easy-to-Read


Referrals: 


Strand,Duane, MD [Primary Care Provider] - 


Forms:  ED Department Discharge





<Huber Cameron - Last Filed: 21 07:58>





ED HPI GENERAL MEDICAL PROBLEM





- General


Source of Information: Reports: Patient, Family


History Limitations: Reports: No Limitations





- History of Present Illness


INITIAL COMMENTS - FREE TEXT/NARRATIVE: 





Patient is a 62 YO WF who presented to the ED because of slurred speech and 

right facial droop at about 0430. She also c/o bifrontal headache,4/10. There is

no double or blurry vision. No weakness or lost of sensation of both arms and 

legs. Her slurred speech resolved while in the ED. She has a h/o DM2, HTN, 

Dyslipidemia, Stage 4 Liver CA and previous NSTEMI. She had 3 chemo for her 

Liver CA which was diagnosed in 2021.





Past Medical History


HEENT History: Reports: Other (See Below)


Other HEENT History: 12 lower bottom teeth extracted/sutures in place, 

placed on Amoxicillin 500mg and oxycodone/apap 5mg prn


Cardiovascular History: Reports: Hypertension, MI, PTCA, Stents, Other (See 

Below)


Respiratory History: Reports: COPD


Gastrointestinal History: Reports: Diverticulosis


Genitourinary History: Reports: UTI, Recurrent


Other OB/GYN History: hysterectomy, 


Musculoskeletal History: Reports: Arthritis, Neck Pain, Chronic


Psychiatric History: Reports: Addiction, Anxiety, Depression, Psych 

Hospitalization(s), Suicide Attempt


Other Psychiatric History: ETOH addiction


Endocrine/Metabolic History: Reports: Diabetes, Type II, Hypothyroidism, 

Obesity/BMI 30+





- Infectious Disease History


Infectious Disease History: Reports: Measles





- Past Surgical History


Head Surgeries/Procedures: Reports: None


HEENT Surgical History: Reports: Adenoidectomy, Tonsillectomy


Cardiovascular Surgical History: Reports: Coronary Artery Stent


Respiratory Surgical History: Reports: None


GI Surgical History: Reports: Cholecystectomy, Colonoscopy, EGD


Female  Surgical History: Reports: Hysterectomy, Salpingo-Oophorectomy, Tubal 

Ligation, Other (See Below)


Other Female  Surgeries/Procedures: rectocele


Endocrine Surgical History: Reports: Other (See Below)


Other Endocrine Surgeries/Procedures: blood sugar reader right upper/arm





Social & Family History





- Family History


Family Medical History: No Pertinent Family History





- Caffeine Use


Caffeine Use: Reports: None


Caffeine Use Comment: 1-2 diet sodas daily





ED ROS GENERAL





- Review of Systems


Review Of Systems: See Below


Constitutional: Reports: No Symptoms


HEENT: Reports: No Symptoms


Respiratory: Reports: No Symptoms


Cardiovascular: Reports: No Symptoms


Endocrine: Reports: No Symptoms


GI/Abdominal: Reports: No Symptoms


: Reports: No Symptoms


Musculoskeletal: Reports: No Symptoms


Skin: Reports: No Symptoms


Neurological: Reports: Headache


Psychiatric: Reports: No Symptoms


Hematologic/Lymphatic: Reports: No Symptoms





ED EXAM, NEURO





- Physical Exam


Exam: See Below


Exam Limited By: No Limitations


General Appearance: Alert, No Apparent Distress


Ears: Normal External Exam, Normal Canal


Nose: Normal Inspection, Normal Mucosa, No Blood


Throat/Mouth: Normal Inspection, Normal Lips


Head Exam: Atraumatic, Normocephalic, Other (Rt facial droop)


Neck: Normal Inspection, Supple, Non-Tender


Respiratory/Chest: No Respiratory Distress, Lungs Clear, Normal Breath Sounds, 

No Accessory Muscle Use, Chest Non-Tender


Cardiovascular: Normal Peripheral Pulses, Regular Rate, Rhythm, No Edema, No 

Gallop


GI/Abdominal: Normal Bowel Sounds, Soft, Non-Tender, No Organomegaly, No 

Distention, No Abnormal Bruit


Neurological: Alert, Normal Mood/Affect, Normal Dorsiflexion, CN II-XII Intact, 

Normal Plantar Flexion, Normal Gait, Normal Reflexes, No Motor/Sensory Deficits,

Oriented x 3


Back Exam: Normal Inspection, Full Range of Motion


Extremities: Normal Inspection, Normal Range of Motion, Non-Tender, No Pedal 

Edema, Normal Capillary Refill


Psychiatric: Normal Affect


  ** #1 Interpretation


EKG Date: 21


Time: 05:51


Rhythm: NSR


Rate (Beats/Min): 94


Axis: Normal


QRS: LBBB


ST-T: Normal


QT: Normal


NH/PQ Interval: 172


Comparison: No Change


EKG Interpretation Comments: 





NSR


LBBB





Course





- Vital Signs


Text/Narrative:: 





Lab/EKG/CXR/Head CT result was reviewed and discussed with patient


Neurology consult at [facility]() who want patient to be on 24H 

observation and have an angiogram of the head and neck prior to discharge.


He also recommended MRI -brain and MRA brain/neck as an out patient prior to her

Neurology follow up.


Last Recorded V/S: 


                                Last Vital Signs











Temp  35.4 C L  21 05:36


 


Pulse  107 H  21 05:36


 


Resp  18   21 05:36


 


BP  155/85 H  21 05:36


 


Pulse Ox  98   21 05:36














- Orders/Labs/Meds


Labs: 


                                Laboratory Tests











  21 Range/Units





  05:40 05:50 06:00 


 


WBC    3.1  (3.0-10.3)  x10-3/uL


 


RBC    3.72  (3.60-5.20)  x10(6)uL


 


Hgb    10.1 L  (11.4-15.5)  g/dL


 


Hct    30.7 L  (34.2-48.2)  %


 


MCV    82.4  (76.7-100.5)  fL


 


MCH    27.0  (23.9-33.9)  pg


 


MCHC    32.8  (31.9-34.8)  g/dL


 


RDW    15.2  (12.3-16.5)  %


 


Plt Count    756 H  (151-488)  x10(3)uL


 


MPV    6.7 L  (7.1-12.4)  fL


 


Neut % (Auto)    50.3  (30.8-76.2)  %


 


Lymph % (Auto)    44.2  (18.4-52.1)  %


 


Mono % (Auto)    1.2 L  (4.4-15.7)  %


 


Eos % (Auto)    3.6  (0.6-8.1)  %


 


Baso % (Auto)    0.7  (0.2-1.5)  %


 


Neut # (Auto)    1.5  (1.5-6.3)  x10-3/uL


 


Lymph # (Auto)    1.3  (1.0-4.4)  x10-3/uL


 


Mono # (Auto)    0.0 L  (0.3-1.0)  x10-3/uL


 


Eos # (Auto)    0.1  (0.0-0.8)  x10-3/uL


 


Baso # (Auto)    0.0  (0.0-0.1)  x10-3/uL


 


PT     (9.0-11.1)  sec


 


INR     (1.00-1.24)  


 


APTT     (24.4-33.2)  SECONDS


 


Sodium     (135-145)  mmol/L


 


Potassium     (3.5-5.3)  mmol/L


 


Chloride     (100-110)  mmol/L


 


Carbon Dioxide     (21-32)  mmol/L


 


BUN     (7-18)  mg/dL


 


Creatinine     (0.55-1.02)  mg/dL


 


Est Cr Clr Drug Dosing     mL/min


 


Estimated GFR (MDRD)     (>60)  


 


BUN/Creatinine Ratio     (9-20)  


 


Glucose     ()  mg/dL


 


POC Glucose  95  D    ()  mg/dL


 


Calcium     (8.6-10.2)  mg/dL


 


Phosphorus   5.0 H   (2.6-4.6)  mg/dL


 


Magnesium   0.6 L*   (1.8-2.5)  mg/dL


 


Total Bilirubin     (0.1-1.3)  mg/dL


 


AST     (5-25)  IU/L


 


ALT     (12-36)  U/L


 


Alkaline Phosphatase     ()  IU/L


 


Troponin I     (4.0-60.3)  pg/mL


 


Total Protein     (6.0-8.0)  g/dL


 


Albumin     (3.2-4.6)  g/dL


 


Globulin     g/dL


 


Albumin/Globulin Ratio     














  21 Range/Units





  06:00 06:00 06:00 


 


WBC     (3.0-10.3)  x10-3/uL


 


RBC     (3.60-5.20)  x10(6)uL


 


Hgb     (11.4-15.5)  g/dL


 


Hct     (34.2-48.2)  %


 


MCV     (76.7-100.5)  fL


 


MCH     (23.9-33.9)  pg


 


MCHC     (31.9-34.8)  g/dL


 


RDW     (12.3-16.5)  %


 


Plt Count     (151-488)  x10(3)uL


 


MPV     (7.1-12.4)  fL


 


Neut % (Auto)     (30.8-76.2)  %


 


Lymph % (Auto)     (18.4-52.1)  %


 


Mono % (Auto)     (4.4-15.7)  %


 


Eos % (Auto)     (0.6-8.1)  %


 


Baso % (Auto)     (0.2-1.5)  %


 


Neut # (Auto)     (1.5-6.3)  x10-3/uL


 


Lymph # (Auto)     (1.0-4.4)  x10-3/uL


 


Mono # (Auto)     (0.3-1.0)  x10-3/uL


 


Eos # (Auto)     (0.0-0.8)  x10-3/uL


 


Baso # (Auto)     (0.0-0.1)  x10-3/uL


 


PT  11.2 H    (9.0-11.1)  sec


 


INR  1.04    (1.00-1.24)  


 


APTT  26.7    (24.4-33.2)  SECONDS


 


Sodium   137   (135-145)  mmol/L


 


Potassium   3.6   (3.5-5.3)  mmol/L


 


Chloride   99 L   (100-110)  mmol/L


 


Carbon Dioxide   29   (21-32)  mmol/L


 


BUN   20 H   (7-18)  mg/dL


 


Creatinine   1.3 H   (0.55-1.02)  mg/dL


 


Est Cr Clr Drug Dosing   32.23   mL/min


 


Estimated GFR (MDRD)   42 L   (>60)  


 


BUN/Creatinine Ratio   15.4   (9-20)  


 


Glucose   121 H D   ()  mg/dL


 


POC Glucose     ()  mg/dL


 


Calcium   5.7 L* D   (8.6-10.2)  mg/dL


 


Phosphorus     (2.6-4.6)  mg/dL


 


Magnesium     (1.8-2.5)  mg/dL


 


Total Bilirubin   0.5   (0.1-1.3)  mg/dL


 


AST   36 H   (5-25)  IU/L


 


ALT   23  D   (12-36)  U/L


 


Alkaline Phosphatase   103   ()  IU/L


 


Troponin I    7.3  (4.0-60.3)  pg/mL


 


Total Protein   7.9   (6.0-8.0)  g/dL


 


Albumin   3.2   (3.2-4.6)  g/dL


 


Globulin   4.7   g/dL


 


Albumin/Globulin Ratio   0.7   











Meds: 


Medications














Discontinued Medications














Generic Name Dose Route Start Last Admin





  Trade Name Freq  PRN Reason Stop Dose Admin


 


Aspirin  324 mg  21 06:33  21 06:42





  Aspirin 81 Mg Tab.Chew  PO  21 06:34  324 mg





  NOW STA   Administration


 


Calcium Gluconate  2 gm  21 08:01  21 08:10





  Calcium Gluconate 10% 1 Gm/10 Ml Sdv  IVPUSH  21 08:02  2 gm





  ONETIME ONE   Administration


 


Clopidogrel Bisulfate  75 mg  21 06:33  21 06:42





  Clopidogrel 75 Mg Tab  PO  21 06:34  75 mg





  ONETIME ONE   Administration


 


Sodium Chloride  1,000 mls @ 125 mls/hr  21 07:15  21 07:36





  Normal Saline  IV   125 mls/hr





  ASDIRECTED AKANKSHA   Administration


 


Magnesium Sulfate 4 gm/ Premix  50 mls @ 12.5 mls/hr  21 08:28  21 

08:33





  IV  21 12:27  12.5 mls/hr





  ONETIME ONE   Administration


 


Iopamidol  100 ml  12/05/21 06:49  21 09:45





  Iopamidol 755 Mg/Ml 100 Ml Bottle  IV  21 06:50  85 ml





  .AS DIRECTED ONE   Administration


 


Labetalol HCl  20 mg  21 08:47  21 08:47





  Labetalol 20 Mg/4 Ml Syringe  IVPUSH  21 08:48  20 mg





  ONETIME ONE   Administration





  Protocol  


 


Sodium Chloride  10 ml  21 05:52  21 06:40





  Sodium Chloride 0.9% 10 Ml Syringe  FLUSH   10 ml





  ASDIRECTED PRN   Administration





  Keep Vein Open

## 2021-12-25 ENCOUNTER — HOSPITAL ENCOUNTER (EMERGENCY)
Dept: HOSPITAL 7 - FB.ED | Age: 61
Discharge: HOME | End: 2021-12-25
Payer: COMMERCIAL

## 2021-12-25 VITALS — HEART RATE: 107 BPM | DIASTOLIC BLOOD PRESSURE: 71 MMHG | SYSTOLIC BLOOD PRESSURE: 130 MMHG

## 2021-12-25 DIAGNOSIS — Z88.8: ICD-10-CM

## 2021-12-25 DIAGNOSIS — Z79.4: ICD-10-CM

## 2021-12-25 DIAGNOSIS — G43.909: Primary | ICD-10-CM

## 2021-12-25 DIAGNOSIS — E11.9: ICD-10-CM

## 2021-12-25 DIAGNOSIS — J44.9: ICD-10-CM

## 2021-12-25 DIAGNOSIS — E03.9: ICD-10-CM

## 2021-12-25 DIAGNOSIS — Z88.5: ICD-10-CM

## 2021-12-25 DIAGNOSIS — Z79.02: ICD-10-CM

## 2021-12-25 DIAGNOSIS — E66.9: ICD-10-CM

## 2021-12-25 DIAGNOSIS — Z79.899: ICD-10-CM

## 2021-12-25 DIAGNOSIS — Z79.82: ICD-10-CM

## 2021-12-25 NOTE — EDM.PDOC
ED HPI GENERAL MEDICAL PROBLEM





- General


Chief Complaint: Headache


Stated Complaint: headache


Time Seen by Provider: 21 13:10


Source of Information: Reports: Patient


History Limitations: Reports: No Limitations





- History of Present Illness


INITIAL COMMENTS - FREE TEXT/NARRATIVE: 


Celina complains of a headache,starting a few days ago. Occipital,5/10. Has had 

a recent TIA,and is on chemo for Cholangiocarcinoma. Tried Fioricet and 

Oxycodone at home with no relief.


Treatments PTA: Reports: Other (see below)


Other Treatments PTA: Butalbital-Acetaminophen-Caffeine 50/325 1 tab PRN 0900H


  ** Headache


Pain Score (Numeric/FACES): 5





  ** Bilateral Lower Leg


Pain Score (Numeric/FACES): 3





- Related Data


                                    Allergies











Allergy/AdvReac Type Severity Reaction Status Date / Time


 


cyclobenzaprine HCl Allergy Mild Irritabilit Verified 21 12:47





[From Flexeril]   y  


 


ibuprofen Allergy Mild Hives Verified 21 12:47


 


morphine Allergy Mild Rash Verified 21 12:47


 


codeine Allergy Unknown Chest Verified 21 12:47





   Tightness  


 


tramadol Allergy  Other Verified 21 12:47











Home Meds: 


                                    Home Meds





Aspirin 81 mg PO DAILY 11/14/15 [History]


Insulin Detemir [Levemir Flextouch] 80 unit SQ BEDTIME 11/14/15 [History]


Lisinopril 30 mg PO DAILY 11/14/15 [History]


Insulin Aspart [NovoLOG] 10 units SQ BID PRN 20 [History]


Loratadine 10 mg PO DAILY 20 [History]


Metoprolol Succinate 150 mg PO DAILY 20 [History]


Clopidogrel [Plavix] 75 mg PO DAILY 21 [History]


Ondansetron [Zofran ODT] 4 mg PO Q4H PRN #5 tab.dis 21 [Rx]


ALPRAZolam [Alprazolam] 0.5 mg PO BID 21 [History]


Acetaminophen/oxyCODONE [Percocet 325-5 MG] 1 each PO Q6H PRN 21 [History]


DULoxetine [Cymbalta] 20 mg PO DAILY 21 [History]


DULoxetine [Cymbalta] 60 mg PO DAILY 21 [History]


Omeprazole 40 mg PO DAILY 21 [History]


Prochlorperazine [Compazine] 10 mg PO Q6H PRN 21 [History]


ondansetron HCL [Ondansetron HCl] 8 mg PO Q8H PRN 21 [History]


oxyCODONE 10 mg PO Q4H PRN 21 [History]











Past Medical History


HEENT History: Reports: Other (See Below)


Other HEENT History: bleeding in R eye


Cardiovascular History: Reports: High Cholesterol, Hypertension, MI, PTCA, 

Stents


Respiratory History: Reports: COPD


Gastrointestinal History: Reports: Diverticulosis, Other (See Below)


Other Gastrointestinal History: hx liver CA


Genitourinary History: Reports: UTI, Recurrent


OB/GYN History: Reports: Pregnancy


Other OB/GYN History: hysterectomy, 


Musculoskeletal History: Reports: Arthritis, Neck Pain, Chronic


Neurological History: Reports: Migraines


Psychiatric History: Reports: Addiction, Anxiety, Depression, Psych 

Hospitalization(s), Suicide Attempt


Other Psychiatric History: ETOH addiction


Endocrine/Metabolic History: Reports: Diabetes, Type II, Hypothyroidism, 

Obesity/BMI 30+


Hematologic History: Reports: Anticoagulation Therapy


Other Hematologic History: takes Plavix


Oncologic (Cancer) History: Reports: Liver





- Infectious Disease History


Infectious Disease History: Reports: Mumps





- Past Surgical History


Head Surgeries/Procedures: Reports: None


HEENT Surgical History: Reports: Adenoidectomy, Tonsillectomy


Other HEENT Surgeries/Procedures: bilat cataract


Cardiovascular Surgical History: Reports: Coronary Artery Stent


Respiratory Surgical History: Reports: None


GI Surgical History: Reports: Cholecystectomy, Colonoscopy, EGD


Female  Surgical History: Reports: Hysterectomy, Salpingo-Oophorectomy, Tubal 

Ligation, Other (See Below)


Other Female  Surgeries/Procedures: rectocele


Endocrine Surgical History: Reports: Other (See Below)


Other Endocrine Surgeries/Procedures: blood sugar reader right upper/arm


Musculoskeletal Surgical History: Reports: None





Social & Family History





- Family History


Family Medical History: No Pertinent Family History





- Caffeine Use


Caffeine Use: Reports: Soda


Caffeine Use Comment: 1-2 diet sodas daily





- Recreational Drug Use


Recreational Drug Use: No





ED ROS GENERAL





- Review of Systems


Review Of Systems: Comprehensive ROS is negative, except as noted in HPI.





- Physical Exam


Exam: See Below


Exam Limited By: No Limitations


General Appearance: Alert, WD/WN


Ears: Normal External Exam


Nose: Normal Inspection


Throat/Mouth: Normal Inspection


Head Exam: Atraumatic, Normocephalic


Neck: Normal Inspection





Course





- Vital Signs


Last Recorded V/S: 


                                Last Vital Signs











Temp  96.5 F L  21 12:30


 


Pulse  107 H  21 12:30


 


Resp  18   21 12:30


 


BP  130/71   21 12:30


 


Pulse Ox  96   21 12:30














- Orders/Labs/Meds


Orders: 


                               Active Orders 24 hr











 Category Date Time Status


 


 Head wo Cont [CT] Stat Exams  21 13:09 Ordered











Labs: 


                                Laboratory Tests











  21 Range/Units





  13:15 13:15 


 


WBC  5.2   (3.0-10.3)  x10-3/uL


 


RBC  3.22 L   (3.60-5.20)  x10(6)uL


 


Hgb  9.2 L   (11.4-15.5)  g/dL


 


Hct  27.8 L   (34.2-48.2)  %


 


MCV  86.3   (76.7-100.5)  fL


 


MCH  28.6   (23.9-33.9)  pg


 


MCHC  33.2   (31.9-34.8)  g/dL


 


RDW  19.2 H   (12.3-16.5)  %


 


Plt Count  213   (151-488)  x10(3)uL


 


MPV  6.8 L   (7.1-12.4)  fL


 


Neut % (Auto)  66.9   (30.8-76.2)  %


 


Lymph % (Auto)  21.6   (18.4-52.1)  %


 


Mono % (Auto)  3.4 L   (4.4-15.7)  %


 


Eos % (Auto)  7.2   (0.6-8.1)  %


 


Baso % (Auto)  0.9   (0.2-1.5)  %


 


Neut # (Auto)  3.5   (1.5-6.3)  x10-3/uL


 


Lymph # (Auto)  1.1   (1.0-4.4)  x10-3/uL


 


Mono # (Auto)  0.2 L   (0.3-1.0)  x10-3/uL


 


Eos # (Auto)  0.4   (0.0-0.8)  x10-3/uL


 


Baso # (Auto)  0.0   (0.0-0.1)  x10-3/uL


 


Sodium   136  (135-145)  mmol/L


 


Potassium   4.3  (3.5-5.3)  mmol/L


 


Chloride   99 L  (100-110)  mmol/L


 


Carbon Dioxide   33 H  (21-32)  mmol/L


 


BUN   25 H  (7-18)  mg/dL


 


Creatinine   1.3 H  (0.55-1.02)  mg/dL


 


Est Cr Clr Drug Dosing   38.75  mL/min


 


Estimated GFR (MDRD)   42 L  (>60)  


 


BUN/Creatinine Ratio   19.2  (9-20)  


 


Glucose   85  ()  mg/dL


 


Calcium   8.7  D  (8.6-10.2)  mg/dL


 


Phosphorus   3.8  (2.6-4.6)  mg/dL


 


Magnesium   2.4  (1.8-2.5)  mg/dL











Meds: 


Medications














Discontinued Medications














Generic Name Dose Route Start Last Admin





  Trade Name Chirag  PRN Reason Stop Dose Admin


 


Hydroxyzine HCl  50 mg  21 14:00  21 14:05





  Hydroxyzine Hcl 50 Mg/Ml Sdv  IM  21 14:01  50 mg





  ONETIME ONE   Administration


 


Meperidine HCl  50 mg  21 14:00  21 14:13





  Meperidine Pf 50 Mg/Ml Syringe  IM  21 14:01  50 mg





  ONETIME ONE   Administration














Departure





- Departure


Time of Disposition: 15:01


Disposition: Home, Self-Care 01


Clinical Impression: 


 Migraine








- Discharge Information


Instructions:  General Headache Without Cause, General Headache Without Cause, 

Easy-to-Read


Referrals: 


Chance Ochoa PA [Primary Care Provider] - 


Forms:  ED Department Discharge


Additional Instructions: 


Increase your fluid/water intake. Take your prescribed medication for headache 

as you need it.





Follow-up with your primary care physician as needed.





Come back to the ER for any acute worsening headache, dizziness, nausea, 

vomiting, weakness or other symptoms.





Sepsis Event Note (ED)





- Focused Exam


Vital Signs: 


                                   Vital Signs











  Temp Pulse Resp BP Pulse Ox


 


 21 12:30  96.5 F L  107 H  18  130/71  96














- Problem List & Annotations


(1) Headache


SNOMED Code(s): 72660491


   Code(s): R51.9 - HEADACHE, UNSPECIFIED   Status: Acute   Current Visit: Yes  







(2) Hypomagnesemia


SNOMED Code(s): 256131609


   Code(s): E83.42 - HYPOMAGNESEMIA   Status: Acute   Current Visit: No   





- Problem List Review


Problem List Initiated/Reviewed/Updated: Yes





- My Orders


Last 24 Hours: 


My Active Orders





21 13:09


Head wo Cont [CT] Stat 














- Assessment/Plan


Last 24 Hours: 


My Active Orders





21 13:09


Head wo Cont [CT] Stat 











Plan: 


Her labs,including Magnesium,were normal. CT head normal.I gave her parenteral 

Demerol and Vistaril.

## 2022-03-14 ENCOUNTER — HOSPITAL ENCOUNTER (EMERGENCY)
Dept: HOSPITAL 7 - FB.ED | Age: 62
Discharge: HOME | End: 2022-03-14
Payer: COMMERCIAL

## 2022-03-14 DIAGNOSIS — I25.2: ICD-10-CM

## 2022-03-14 DIAGNOSIS — J44.9: ICD-10-CM

## 2022-03-14 DIAGNOSIS — Z88.8: ICD-10-CM

## 2022-03-14 DIAGNOSIS — R07.89: Primary | ICD-10-CM

## 2022-03-14 DIAGNOSIS — I10: ICD-10-CM

## 2022-03-14 DIAGNOSIS — E66.9: ICD-10-CM

## 2022-03-14 DIAGNOSIS — Z88.5: ICD-10-CM

## 2022-03-14 DIAGNOSIS — E11.9: ICD-10-CM

## 2022-03-14 DIAGNOSIS — Z79.4: ICD-10-CM

## 2022-03-14 DIAGNOSIS — Z79.02: ICD-10-CM

## 2022-03-14 DIAGNOSIS — E03.9: ICD-10-CM

## 2022-03-14 DIAGNOSIS — E78.00: ICD-10-CM

## 2022-03-14 DIAGNOSIS — Z79.899: ICD-10-CM

## 2022-03-14 RX ADMIN — NITROGLYCERIN PRN MG: 0.4 TABLET SUBLINGUAL at 20:12

## 2022-03-14 RX ADMIN — Medication PRN ML: at 19:55

## 2022-03-14 RX ADMIN — NITROGLYCERIN PRN MG: 0.4 TABLET SUBLINGUAL at 20:00

## 2022-03-14 RX ADMIN — Medication PRN ML: at 21:10

## 2022-03-15 VITALS — DIASTOLIC BLOOD PRESSURE: 62 MMHG | SYSTOLIC BLOOD PRESSURE: 130 MMHG

## 2022-05-02 ENCOUNTER — HOSPITAL ENCOUNTER (OUTPATIENT)
Dept: HOSPITAL 7 - FB.MS | Age: 62
Setting detail: OBSERVATION
LOS: 3 days | Discharge: HOME | End: 2022-05-05
Attending: STUDENT IN AN ORGANIZED HEALTH CARE EDUCATION/TRAINING PROGRAM | Admitting: STUDENT IN AN ORGANIZED HEALTH CARE EDUCATION/TRAINING PROGRAM
Payer: COMMERCIAL

## 2022-05-02 DIAGNOSIS — R53.1: Primary | ICD-10-CM

## 2022-05-02 DIAGNOSIS — E66.9: ICD-10-CM

## 2022-05-02 DIAGNOSIS — E78.00: ICD-10-CM

## 2022-05-02 DIAGNOSIS — Z90.49: ICD-10-CM

## 2022-05-02 DIAGNOSIS — I95.9: ICD-10-CM

## 2022-05-02 DIAGNOSIS — E83.42: ICD-10-CM

## 2022-05-02 DIAGNOSIS — C22.9: ICD-10-CM

## 2022-05-02 DIAGNOSIS — E03.9: ICD-10-CM

## 2022-05-02 DIAGNOSIS — I25.2: ICD-10-CM

## 2022-05-02 DIAGNOSIS — Z88.5: ICD-10-CM

## 2022-05-02 DIAGNOSIS — N18.32: ICD-10-CM

## 2022-05-02 DIAGNOSIS — R00.0: ICD-10-CM

## 2022-05-02 DIAGNOSIS — E87.1: ICD-10-CM

## 2022-05-02 DIAGNOSIS — Z88.8: ICD-10-CM

## 2022-05-02 DIAGNOSIS — Z79.899: ICD-10-CM

## 2022-05-02 DIAGNOSIS — N17.9: ICD-10-CM

## 2022-05-02 DIAGNOSIS — Z79.82: ICD-10-CM

## 2022-05-02 DIAGNOSIS — F41.9: ICD-10-CM

## 2022-05-02 DIAGNOSIS — Z98.890: ICD-10-CM

## 2022-05-02 DIAGNOSIS — I12.9: ICD-10-CM

## 2022-05-02 DIAGNOSIS — N39.0: ICD-10-CM

## 2022-05-02 PROCEDURE — 85025 COMPLETE CBC W/AUTO DIFF WBC: CPT

## 2022-05-02 PROCEDURE — 80048 BASIC METABOLIC PNL TOTAL CA: CPT

## 2022-05-02 PROCEDURE — 86922 COMPATIBILITY TEST ANTIGLOB: CPT

## 2022-05-02 PROCEDURE — 87088 URINE BACTERIA CULTURE: CPT

## 2022-05-02 PROCEDURE — 83735 ASSAY OF MAGNESIUM: CPT

## 2022-05-02 PROCEDURE — 85014 HEMATOCRIT: CPT

## 2022-05-02 PROCEDURE — 0097U: CPT

## 2022-05-02 PROCEDURE — 87186 SC STD MICRODIL/AGAR DIL: CPT

## 2022-05-02 PROCEDURE — 82270 OCCULT BLOOD FECES: CPT

## 2022-05-02 PROCEDURE — 85018 HEMOGLOBIN: CPT

## 2022-05-02 PROCEDURE — G0379 DIRECT REFER HOSPITAL OBSERV: HCPCS

## 2022-05-02 PROCEDURE — 36415 COLL VENOUS BLD VENIPUNCTURE: CPT

## 2022-05-02 PROCEDURE — 86850 RBC ANTIBODY SCREEN: CPT

## 2022-05-02 PROCEDURE — 86920 COMPATIBILITY TEST SPIN: CPT

## 2022-05-02 PROCEDURE — 36430 TRANSFUSION BLD/BLD COMPNT: CPT

## 2022-05-02 PROCEDURE — 81001 URINALYSIS AUTO W/SCOPE: CPT

## 2022-05-02 PROCEDURE — P9016 RBC LEUKOCYTES REDUCED: HCPCS

## 2022-05-02 PROCEDURE — 86900 BLOOD TYPING SEROLOGIC ABO: CPT

## 2022-05-02 PROCEDURE — G0378 HOSPITAL OBSERVATION PER HR: HCPCS

## 2022-05-02 PROCEDURE — 86901 BLOOD TYPING SEROLOGIC RH(D): CPT

## 2022-05-02 PROCEDURE — 87086 URINE CULTURE/COLONY COUNT: CPT

## 2022-05-02 PROCEDURE — 51798 US URINE CAPACITY MEASURE: CPT

## 2022-05-02 RX ADMIN — Medication PRN ML: at 21:06

## 2022-05-03 RX ADMIN — INSULIN LISPRO SCH: 100 INJECTION, SOLUTION INTRAVENOUS; SUBCUTANEOUS at 11:15

## 2022-05-03 RX ADMIN — Medication SCH MG: at 10:29

## 2022-05-03 RX ADMIN — Medication SCH MG: at 20:11

## 2022-05-03 RX ADMIN — INSULIN DETEMIR SCH UNITS: 100 INJECTION, SOLUTION SUBCUTANEOUS at 20:12

## 2022-05-03 RX ADMIN — ONDANSETRON SCH MG: 8 TABLET, ORALLY DISINTEGRATING ORAL at 20:14

## 2022-05-03 RX ADMIN — BUDESONIDE AND FORMOTEROL FUMARATE DIHYDRATE SCH MG: 160; 4.5 AEROSOL RESPIRATORY (INHALATION) at 20:10

## 2022-05-03 RX ADMIN — ONDANSETRON SCH MG: 8 TABLET, ORALLY DISINTEGRATING ORAL at 10:33

## 2022-05-03 RX ADMIN — BUDESONIDE AND FORMOTEROL FUMARATE DIHYDRATE SCH MG: 160; 4.5 AEROSOL RESPIRATORY (INHALATION) at 11:30

## 2022-05-03 RX ADMIN — METOPROLOL SUCCINATE SCH MG: 100 TABLET, FILM COATED, EXTENDED RELEASE ORAL at 10:34

## 2022-05-03 RX ADMIN — INSULIN LISPRO SCH UNITS: 100 INJECTION, SOLUTION INTRAVENOUS; SUBCUTANEOUS at 17:51

## 2022-05-04 RX ADMIN — BUDESONIDE AND FORMOTEROL FUMARATE DIHYDRATE SCH MG: 160; 4.5 AEROSOL RESPIRATORY (INHALATION) at 19:55

## 2022-05-04 RX ADMIN — INSULIN DETEMIR SCH UNITS: 100 INJECTION, SOLUTION SUBCUTANEOUS at 20:04

## 2022-05-04 RX ADMIN — Medication PRN ML: at 23:34

## 2022-05-04 RX ADMIN — Medication PRN ML: at 20:49

## 2022-05-04 RX ADMIN — METOPROLOL SUCCINATE SCH MG: 100 TABLET, FILM COATED, EXTENDED RELEASE ORAL at 08:23

## 2022-05-04 RX ADMIN — Medication SCH MG: at 08:14

## 2022-05-04 RX ADMIN — INSULIN LISPRO SCH UNITS: 100 INJECTION, SOLUTION INTRAVENOUS; SUBCUTANEOUS at 08:16

## 2022-05-04 RX ADMIN — INSULIN LISPRO SCH UNITS: 100 INJECTION, SOLUTION INTRAVENOUS; SUBCUTANEOUS at 17:48

## 2022-05-04 RX ADMIN — BUDESONIDE AND FORMOTEROL FUMARATE DIHYDRATE SCH MG: 160; 4.5 AEROSOL RESPIRATORY (INHALATION) at 12:19

## 2022-05-04 RX ADMIN — BUDESONIDE AND FORMOTEROL FUMARATE DIHYDRATE SCH MG: 160; 4.5 AEROSOL RESPIRATORY (INHALATION) at 04:05

## 2022-05-04 RX ADMIN — ONDANSETRON SCH MG: 8 TABLET, ORALLY DISINTEGRATING ORAL at 08:14

## 2022-05-04 RX ADMIN — HEPARIN SODIUM (PORCINE) LOCK FLUSH IV SOLN 100 UNIT/ML PRN UNITS: 100 SOLUTION at 14:15

## 2022-05-04 RX ADMIN — HEPARIN SODIUM (PORCINE) LOCK FLUSH IV SOLN 100 UNIT/ML PRN UNITS: 100 SOLUTION at 23:35

## 2022-05-04 RX ADMIN — INSULIN LISPRO SCH UNITS: 100 INJECTION, SOLUTION INTRAVENOUS; SUBCUTANEOUS at 12:20

## 2022-05-04 RX ADMIN — Medication SCH MG: at 20:03

## 2022-05-04 RX ADMIN — Medication PRN ML: at 14:14

## 2022-05-04 RX ADMIN — ONDANSETRON SCH MG: 8 TABLET, ORALLY DISINTEGRATING ORAL at 20:04

## 2022-05-05 VITALS — SYSTOLIC BLOOD PRESSURE: 116 MMHG | DIASTOLIC BLOOD PRESSURE: 55 MMHG | HEART RATE: 97 BPM

## 2022-05-05 RX ADMIN — INSULIN LISPRO SCH UNITS: 100 INJECTION, SOLUTION INTRAVENOUS; SUBCUTANEOUS at 07:53

## 2022-05-05 RX ADMIN — Medication PRN ML: at 06:33

## 2022-05-05 RX ADMIN — Medication SCH MG: at 08:02

## 2022-05-05 RX ADMIN — HEPARIN SODIUM (PORCINE) LOCK FLUSH IV SOLN 100 UNIT/ML PRN UNITS: 100 SOLUTION at 06:33

## 2022-05-05 RX ADMIN — METOPROLOL SUCCINATE SCH MG: 100 TABLET, FILM COATED, EXTENDED RELEASE ORAL at 08:02

## 2022-05-05 RX ADMIN — BUDESONIDE AND FORMOTEROL FUMARATE DIHYDRATE SCH MG: 160; 4.5 AEROSOL RESPIRATORY (INHALATION) at 04:02

## 2022-05-05 RX ADMIN — ONDANSETRON SCH MG: 8 TABLET, ORALLY DISINTEGRATING ORAL at 08:03
